# Patient Record
Sex: FEMALE | Race: WHITE | Employment: STUDENT | URBAN - METROPOLITAN AREA
[De-identification: names, ages, dates, MRNs, and addresses within clinical notes are randomized per-mention and may not be internally consistent; named-entity substitution may affect disease eponyms.]

---

## 2019-05-12 ENCOUNTER — HOSPITAL ENCOUNTER (EMERGENCY)
Age: 19
Discharge: HOME | End: 2019-05-12
Attending: EMERGENCY MEDICINE
Payer: COMMERCIAL

## 2019-05-12 ENCOUNTER — APPOINTMENT (OUTPATIENT)
Dept: CT IMAGING | Age: 19
End: 2019-05-12
Attending: EMERGENCY MEDICINE
Payer: COMMERCIAL

## 2019-05-12 VITALS
WEIGHT: 135 LBS | RESPIRATION RATE: 18 BRPM | DIASTOLIC BLOOD PRESSURE: 52 MMHG | SYSTOLIC BLOOD PRESSURE: 107 MMHG | BODY MASS INDEX: 23.17 KG/M2 | TEMPERATURE: 99 F | HEART RATE: 80 BPM | OXYGEN SATURATION: 100 %

## 2019-05-12 DIAGNOSIS — R10.9 ACUTE RIGHT FLANK PAIN: Primary | ICD-10-CM

## 2019-05-12 LAB
ANION GAP SERPL CALC-SCNC: 9 MMOL/L (ref 7–16)
BUN SERPL-MCNC: 15 MG/DL (ref 6–23)
CALCIUM SERPL-MCNC: 9 MG/DL (ref 8.3–10.4)
CHLORIDE SERPL-SCNC: 106 MMOL/L (ref 98–107)
CO2 SERPL-SCNC: 26 MMOL/L (ref 21–32)
CREAT SERPL-MCNC: 0.89 MG/DL (ref 0.6–1)
ERYTHROCYTE [DISTWIDTH] IN BLOOD BY AUTOMATED COUNT: 12 % (ref 11.9–14.6)
GLUCOSE SERPL-MCNC: 91 MG/DL (ref 65–100)
HCG UR QL: NEGATIVE
HCT VFR BLD AUTO: 37.7 % (ref 35.8–46.3)
HGB BLD-MCNC: 12.9 G/DL (ref 11.7–15.4)
MCH RBC QN AUTO: 31.5 PG (ref 26.1–32.9)
MCHC RBC AUTO-ENTMCNC: 34.2 G/DL (ref 31.4–35)
MCV RBC AUTO: 92.2 FL (ref 79.6–97.8)
NRBC # BLD: 0 K/UL (ref 0–0.2)
PLATELET # BLD AUTO: 200 K/UL (ref 150–450)
PMV BLD AUTO: 9 FL (ref 9.4–12.3)
POTASSIUM SERPL-SCNC: 3.8 MMOL/L (ref 3.5–5.1)
RBC # BLD AUTO: 4.09 M/UL (ref 4.05–5.2)
SODIUM SERPL-SCNC: 141 MMOL/L (ref 136–145)
WBC # BLD AUTO: 11.7 K/UL (ref 4.3–11.1)

## 2019-05-12 PROCEDURE — 81003 URINALYSIS AUTO W/O SCOPE: CPT | Performed by: EMERGENCY MEDICINE

## 2019-05-12 PROCEDURE — 96374 THER/PROPH/DIAG INJ IV PUSH: CPT | Performed by: EMERGENCY MEDICINE

## 2019-05-12 PROCEDURE — 80048 BASIC METABOLIC PNL TOTAL CA: CPT

## 2019-05-12 PROCEDURE — 85027 COMPLETE CBC AUTOMATED: CPT

## 2019-05-12 PROCEDURE — 81025 URINE PREGNANCY TEST: CPT

## 2019-05-12 PROCEDURE — 74176 CT ABD & PELVIS W/O CONTRAST: CPT

## 2019-05-12 PROCEDURE — 99284 EMERGENCY DEPT VISIT MOD MDM: CPT | Performed by: EMERGENCY MEDICINE

## 2019-05-12 PROCEDURE — 74011250636 HC RX REV CODE- 250/636: Performed by: EMERGENCY MEDICINE

## 2019-05-12 RX ORDER — PROMETHAZINE HYDROCHLORIDE 25 MG/1
25 TABLET ORAL
Qty: 12 TAB | Refills: 0 | Status: SHIPPED | OUTPATIENT
Start: 2019-05-12 | End: 2019-05-29

## 2019-05-12 RX ORDER — DICLOFENAC SODIUM 50 MG/1
50 TABLET, DELAYED RELEASE ORAL 2 TIMES DAILY
Qty: 14 TAB | Refills: 0 | Status: SHIPPED | OUTPATIENT
Start: 2019-05-12 | End: 2019-05-29

## 2019-05-12 RX ORDER — VENLAFAXINE HYDROCHLORIDE 37.5 MG/1
CAPSULE, EXTENDED RELEASE ORAL DAILY
COMMUNITY
End: 2019-05-29

## 2019-05-12 RX ORDER — CEPHALEXIN 500 MG/1
500 CAPSULE ORAL 2 TIMES DAILY
Qty: 10 CAP | Refills: 0 | Status: SHIPPED | OUTPATIENT
Start: 2019-05-12 | End: 2019-05-17

## 2019-05-12 RX ORDER — KETOROLAC TROMETHAMINE 30 MG/ML
30 INJECTION, SOLUTION INTRAMUSCULAR; INTRAVENOUS
Status: COMPLETED | OUTPATIENT
Start: 2019-05-12 | End: 2019-05-12

## 2019-05-12 RX ADMIN — SODIUM CHLORIDE 1000 ML: 900 INJECTION, SOLUTION INTRAVENOUS at 04:42

## 2019-05-12 RX ADMIN — KETOROLAC TROMETHAMINE 30 MG: 30 INJECTION, SOLUTION INTRAMUSCULAR at 04:42

## 2019-05-12 NOTE — DISCHARGE INSTRUCTIONS
As we discussed, we did not find the exact cause of your symptoms today in the emergency department. Therefore, it is important for you to follow up with your primary care doctor for reevaluation. Please return with any fevers, blood in your vomit or your bowels, worsening symptoms, or additional concerns. 79F PMH HTN p/w HA, gradual onset, for several hrs, diffuse, cant describe quality, similar to prior. Denies vomit, diarrhea, vision changes, weakness/numbness., f/c, neck/back pain, SOB/CP, rhinorrhea/cough, rashes. Ecotrin, no other pain meds.   Also intermittent epigastric discomfort associated w/ nausea, no vomit, x3w. Denies weight loss, night sweats. Per pt, had head imaging ~1-2yrs ago wnl.

## 2019-05-12 NOTE — ED NOTES
I have reviewed discharge instructions with the patient. The patient verbalized understanding. Patient left ED via Discharge Method: ambulatory to Home with mom. Opportunity for questions and clarification provided. Patient given 3 scripts.

## 2019-05-12 NOTE — ED PROVIDER NOTES
19year old lady presents with concerns about sudden onset pain in her right flank. Patient says she had some similar about a week ago and went to an urgent care. That time she was diagnosed with a probable UTI and given some antibiotic. Patient said the pain went away so she did not finish the course of antibiotics. He said the pain then came back she tried some over-the-counter medicine without relief. She rates her pain as a 7 out of 10 and says it is a sharp stabbing pain that radiates somewhat to the back to the front. She denies any vaginal bleeding, discharge, itch, burning, or odor. No known history of appendix problems or kidney stones. She has had her gallbladder taken out. Elements of this note were created using speech recognition software. As such, errors of speech recognition may be present. Past Medical History:  
Diagnosis Date  Contact dermatitis and other eczema, due to unspecified cause  Ill-defined condition Biliary Dyskinesia History reviewed. No pertinent surgical history. Family History:  
Problem Relation Age of Onset  Hypertension Maternal Grandfather  Elevated Lipids Paternal Grandfather  Cancer Paternal Grandfather   
     stomach  Hypertension Paternal Grandfather  Thyroid Cancer Maternal Grandmother Social History Socioeconomic History  Marital status: SINGLE Spouse name: Not on file  Number of children: Not on file  Years of education: Not on file  Highest education level: Not on file Occupational History  Not on file Social Needs  Financial resource strain: Not on file  Food insecurity:  
  Worry: Not on file Inability: Not on file  Transportation needs:  
  Medical: Not on file Non-medical: Not on file Tobacco Use  Smoking status: Never Smoker  Smokeless tobacco: Never Used Substance and Sexual Activity  Alcohol use: No  
 Drug use:  No  
  Sexual activity: Yes  
  Partners: Male Birth control/protection: Pill Lifestyle  Physical activity:  
  Days per week: Not on file Minutes per session: Not on file  Stress: Not on file Relationships  Social connections:  
  Talks on phone: Not on file Gets together: Not on file Attends Sikhism service: Not on file Active member of club or organization: Not on file Attends meetings of clubs or organizations: Not on file Relationship status: Not on file  Intimate partner violence:  
  Fear of current or ex partner: Not on file Emotionally abused: Not on file Physically abused: Not on file Forced sexual activity: Not on file Other Topics Concern  Not on file Social History Narrative  Not on file ALLERGIES: Patient has no known allergies. Review of Systems Constitutional: Negative for chills, diaphoresis and fever. HENT: Negative for congestion, rhinorrhea and sore throat. Eyes: Negative for redness and visual disturbance. Respiratory: Negative for cough, chest tightness, shortness of breath and wheezing. Cardiovascular: Negative for chest pain and palpitations. Gastrointestinal: Positive for abdominal pain and nausea. Negative for blood in stool, diarrhea and vomiting. Endocrine: Negative for polydipsia and polyuria. Genitourinary: Positive for flank pain. Negative for dysuria and hematuria. Musculoskeletal: Negative for arthralgias, myalgias and neck stiffness. Skin: Negative for rash. Allergic/Immunologic: Negative for environmental allergies and food allergies. Neurological: Negative for dizziness, weakness and headaches. Hematological: Negative for adenopathy. Does not bruise/bleed easily. Psychiatric/Behavioral: Negative for confusion and sleep disturbance. The patient is not nervous/anxious. Vitals:  
 05/12/19 0402 BP: 107/52 Pulse: 80 Resp: 18 Temp: 99 °F (37.2 °C) SpO2: 100% Weight: 61.2 kg (135 lb) Physical Exam  
Constitutional: She is oriented to person, place, and time. She appears well-developed and well-nourished. HENT:  
Head: Normocephalic and atraumatic. Mouth/Throat: Oropharynx is clear and moist.  
Eyes: Pupils are equal, round, and reactive to light. Conjunctivae are normal. Right eye exhibits no discharge. Left eye exhibits no discharge. Neck: No thyromegaly present. Cardiovascular: Normal rate, regular rhythm and normal heart sounds. No murmur heard. Pulmonary/Chest: Effort normal and breath sounds normal.  
Abdominal: Soft. Bowel sounds are normal. There is tenderness. There is no rebound and no guarding. Mild right flank tenderness with no rebound or guarding Musculoskeletal: Normal range of motion. She exhibits no edema. Neurological: She is alert and oriented to person, place, and time. She exhibits normal muscle tone. Coordination normal.  
Skin: Skin is warm and dry. Psychiatric: She has a normal mood and affect. Her behavior is normal.  
  
 
MDM Number of Diagnoses or Management Options Diagnosis management comments: I will get a CT scan to evaluate for possible kidney stone versus appendicitis. 5:43 AM 
CT shows no evidence for appendicitis or kidney stone. I suspect she may have had a partially treated bladder infection or potentially some intestinal spasms. Since she says that her symptoms are significantly worse than they were prior to her initial round of antibiotics I will treat with some Keflex. Procedures

## 2019-05-28 ENCOUNTER — HOSPITAL ENCOUNTER (EMERGENCY)
Age: 19
Discharge: HOME | End: 2019-05-28
Attending: EMERGENCY MEDICINE
Payer: COMMERCIAL

## 2019-05-28 VITALS
TEMPERATURE: 98 F | HEIGHT: 65 IN | BODY MASS INDEX: 22.49 KG/M2 | DIASTOLIC BLOOD PRESSURE: 52 MMHG | WEIGHT: 135 LBS | OXYGEN SATURATION: 100 % | HEART RATE: 68 BPM | RESPIRATION RATE: 15 BRPM | SYSTOLIC BLOOD PRESSURE: 92 MMHG

## 2019-05-28 DIAGNOSIS — R55 SYNCOPE AND COLLAPSE: Primary | ICD-10-CM

## 2019-05-28 DIAGNOSIS — M54.6 RIGHT-SIDED THORACIC BACK PAIN, UNSPECIFIED CHRONICITY: ICD-10-CM

## 2019-05-28 LAB
ALBUMIN SERPL-MCNC: 3.8 G/DL (ref 3.5–5)
ALBUMIN/GLOB SERPL: 1 {RATIO} (ref 1.2–3.5)
ALP SERPL-CCNC: 63 U/L (ref 50–130)
ALT SERPL-CCNC: 33 U/L (ref 12–65)
ANION GAP SERPL CALC-SCNC: 9 MMOL/L (ref 7–16)
APPEARANCE UR: CLEAR
AST SERPL-CCNC: 24 U/L (ref 15–37)
ATRIAL RATE: 60 BPM
BACTERIA URNS QL MICRO: 0 /HPF
BASOPHILS # BLD: 0.1 K/UL (ref 0–0.2)
BASOPHILS NFR BLD: 1 % (ref 0–2)
BILIRUB SERPL-MCNC: 0.5 MG/DL (ref 0.2–1.1)
BILIRUB UR QL: NEGATIVE
BUN SERPL-MCNC: 12 MG/DL (ref 6–23)
CALCIUM SERPL-MCNC: 9.1 MG/DL (ref 8.3–10.4)
CALCULATED P AXIS, ECG09: 52 DEGREES
CALCULATED R AXIS, ECG10: 88 DEGREES
CALCULATED T AXIS, ECG11: 44 DEGREES
CHLORIDE SERPL-SCNC: 106 MMOL/L (ref 98–107)
CO2 SERPL-SCNC: 28 MMOL/L (ref 21–32)
COLOR UR: YELLOW
CREAT SERPL-MCNC: 0.85 MG/DL (ref 0.6–1)
DIAGNOSIS, 93000: NORMAL
DIFFERENTIAL METHOD BLD: ABNORMAL
EOSINOPHIL # BLD: 0.1 K/UL (ref 0–0.8)
EOSINOPHIL NFR BLD: 3 % (ref 0.5–7.8)
ERYTHROCYTE [DISTWIDTH] IN BLOOD BY AUTOMATED COUNT: 12 % (ref 11.9–14.6)
GLOBULIN SER CALC-MCNC: 3.7 G/DL (ref 2.3–3.5)
GLUCOSE SERPL-MCNC: 85 MG/DL (ref 65–100)
GLUCOSE UR STRIP.AUTO-MCNC: NEGATIVE MG/DL
HCG UR QL: NEGATIVE
HCT VFR BLD AUTO: 43.7 % (ref 35.8–46.3)
HGB BLD-MCNC: 14.8 G/DL (ref 11.7–15.4)
HGB UR QL STRIP: NEGATIVE
IMM GRANULOCYTES # BLD AUTO: 0 K/UL (ref 0–0.5)
IMM GRANULOCYTES NFR BLD AUTO: 0 % (ref 0–5)
KETONES UR QL STRIP.AUTO: NEGATIVE MG/DL
LEUKOCYTE ESTERASE UR QL STRIP.AUTO: NEGATIVE
LYMPHOCYTES # BLD: 2.3 K/UL (ref 0.5–4.6)
LYMPHOCYTES NFR BLD: 43 % (ref 13–44)
MCH RBC QN AUTO: 30.8 PG (ref 26.1–32.9)
MCHC RBC AUTO-ENTMCNC: 33.9 G/DL (ref 31.4–35)
MCV RBC AUTO: 91 FL (ref 79.6–97.8)
MONOCYTES # BLD: 0.4 K/UL (ref 0.1–1.3)
MONOCYTES NFR BLD: 7 % (ref 4–12)
NEUTS SEG # BLD: 2.5 K/UL (ref 1.7–8.2)
NEUTS SEG NFR BLD: 47 % (ref 43–78)
NITRITE UR QL STRIP.AUTO: NEGATIVE
NRBC # BLD: 0 K/UL (ref 0–0.2)
P-R INTERVAL, ECG05: 142 MS
PH UR STRIP: 6.5 [PH] (ref 5–9)
PLATELET # BLD AUTO: 303 K/UL (ref 150–450)
PMV BLD AUTO: 8.6 FL (ref 9.4–12.3)
POTASSIUM SERPL-SCNC: 3.5 MMOL/L (ref 3.5–5.1)
PROT SERPL-MCNC: 7.5 G/DL (ref 6.3–8.2)
PROT UR STRIP-MCNC: NEGATIVE MG/DL
Q-T INTERVAL, ECG07: 450 MS
QRS DURATION, ECG06: 90 MS
QTC CALCULATION (BEZET), ECG08: 450 MS
RBC # BLD AUTO: 4.8 M/UL (ref 4.05–5.2)
SODIUM SERPL-SCNC: 143 MMOL/L (ref 136–145)
SP GR UR REFRACTOMETRY: 1.01 (ref 1–1.02)
UROBILINOGEN UR QL STRIP.AUTO: 0.2 EU/DL (ref 0.2–1)
VENTRICULAR RATE, ECG03: 60 BPM
WBC # BLD AUTO: 5.4 K/UL (ref 4.3–11.1)

## 2019-05-28 PROCEDURE — 74011250636 HC RX REV CODE- 250/636: Performed by: EMERGENCY MEDICINE

## 2019-05-28 PROCEDURE — 85025 COMPLETE CBC W/AUTO DIFF WBC: CPT

## 2019-05-28 PROCEDURE — 93005 ELECTROCARDIOGRAM TRACING: CPT | Performed by: EMERGENCY MEDICINE

## 2019-05-28 PROCEDURE — 81025 URINE PREGNANCY TEST: CPT

## 2019-05-28 PROCEDURE — 99285 EMERGENCY DEPT VISIT HI MDM: CPT | Performed by: EMERGENCY MEDICINE

## 2019-05-28 PROCEDURE — 81003 URINALYSIS AUTO W/O SCOPE: CPT

## 2019-05-28 PROCEDURE — 80053 COMPREHEN METABOLIC PANEL: CPT

## 2019-05-28 PROCEDURE — 96360 HYDRATION IV INFUSION INIT: CPT | Performed by: EMERGENCY MEDICINE

## 2019-05-28 RX ADMIN — SODIUM CHLORIDE 1000 ML: 900 INJECTION, SOLUTION INTRAVENOUS at 07:12

## 2019-05-28 NOTE — ED NOTES
I have reviewed discharge instructions with the patient. The patient verbalized understanding. Patient left ED via Discharge Method: ambulatory to Home with family     Opportunity for questions and clarification provided. Patient given 0 scripts. To continue your aftercare when you leave the hospital, you may receive an automated call from our care team to check in on how you are doing. This is a free service and part of our promise to provide the best care and service to meet your aftercare needs.  If you have questions, or wish to unsubscribe from this service please call [phone]. Thank you for Choosing our [facility] Emergency Department.

## 2019-05-28 NOTE — DISCHARGE INSTRUCTIONS
Drink 1/2-2 L of water daily. Do stretching exercises with your workouts. Follow-up with cardiology for possible Holter monitor study if your symptoms persist or if you develop new concerning symptoms return to the ER at that time.

## 2019-05-28 NOTE — ED PROVIDER NOTES
The patient is a 18-year-old female who presents to the emergency department today complaining of 2 syncopal episodes this morning. Patient states that she was saying goodbye to her boyfriend this morning around 5:00 as he was leaving the house and she passed out for just a moment. She said that she hit the ground with her face and is now having a small amount of pain over this area. The patient said that she and the boyfriend got her back and side and then she had another syncopal event. The patient states that over the last 3 weeks she's been dealing with some right back pain and was treated with antibiotics for urinary tract infection ×3. She took 7 days of Bactrim 5 days of Keflex and then 7 days of ciprofloxacin. Patient is not on any antibiotics at this time. The patient states that she's never had a syncopal event similar to this. She is drinking well. She denies any tobacco alcohol or recreational drug use. The patient works at a coffee shop and goes to school. The patient states that she is on birth control pills and notes her last partial cycle was 2 days ago. Past Medical History:   Diagnosis Date    Contact dermatitis and other eczema, due to unspecified cause     Ill-defined condition     Biliary Dyskinesia       History reviewed. No pertinent surgical history.       Family History:   Problem Relation Age of Onset    Hypertension Maternal Grandfather     Elevated Lipids Paternal Grandfather     Cancer Paternal Grandfather         stomach    Hypertension Paternal Grandfather     Thyroid Cancer Maternal Grandmother        Social History     Socioeconomic History    Marital status: SINGLE     Spouse name: Not on file    Number of children: Not on file    Years of education: Not on file    Highest education level: Not on file   Occupational History    Not on file   Social Needs    Financial resource strain: Not on file    Food insecurity:     Worry: Not on file Inability: Not on file    Transportation needs:     Medical: Not on file     Non-medical: Not on file   Tobacco Use    Smoking status: Never Smoker    Smokeless tobacco: Never Used   Substance and Sexual Activity    Alcohol use: No    Drug use: No    Sexual activity: Yes     Partners: Male     Birth control/protection: Pill   Lifestyle    Physical activity:     Days per week: Not on file     Minutes per session: Not on file    Stress: Not on file   Relationships    Social connections:     Talks on phone: Not on file     Gets together: Not on file     Attends Islam service: Not on file     Active member of club or organization: Not on file     Attends meetings of clubs or organizations: Not on file     Relationship status: Not on file    Intimate partner violence:     Fear of current or ex partner: Not on file     Emotionally abused: Not on file     Physically abused: Not on file     Forced sexual activity: Not on file   Other Topics Concern    Not on file   Social History Narrative    Not on file         ALLERGIES: Patient has no known allergies. Review of Systems   All other systems reviewed and are negative. Vitals:    05/28/19 0750 05/28/19 0752 05/28/19 0758 05/28/19 0800   BP: 115/64 104/60 102/63 124/55   Pulse: 69 75 69 73   Resp: 17 13  21   Temp:       SpO2: 98% 100% 100% 100%   Weight:       Height:                Physical Exam     GENERAL:The patient is well nourished, and well-hydrated. VITAL SIGNS: Heart rate, blood pressure, respiratory rate reviewed as recorded in  nurse's notes  EYES: Pupils reactive. Extraocular motion intact. No conjunctival redness or drainage. EARS: No external masses or lesions. NOSE: No nasal drainage or epistaxis. MOUTH/THROAT: Pharynx clear; airway patent. NECK: Supple, no meningeal signs. Trachea midline. No masses or thyromegaly.   LUNGS: Breath sounds clear and equal bilaterally no accessory muscle use  CHEST: No deformity  CARDIOVASCULAR: Regular rate and rhythm  ABDOMEN: Soft without tenderness. No palpable masses or organomegaly. No  peritoneal signs. No rigidity. EXTREMITIES: No clubbing or cyanosis. No joint swelling. Normal muscle tone. No  restricted range of motion appreciated. NEUROLOGIC: Sensation is grossly intact. Cranial nerve exam reveals face is  symmetrical, tongue is midline speech is clear. SKIN: No rash or petechiae. Good skin turgor palpated. PSYCHIATRIC: Alert and oriented. Appropriate behavior and judgment. MDM  Number of Diagnoses or Management Options  Diagnosis management comments: Scoliosis, muscle strain, UTI, pregnancy,    tension headache, migraine headache,    meningitis, encephalitis,    seizure, pseudoseizures, status epilepticus, malingering    peripheral neuropathy, metabolic disorder, Guillian Gordon syndrome, multiple sclerosis,    electrolyte abnormality         Amount and/or Complexity of Data Reviewed  Clinical lab tests: ordered and reviewed      ED Course as of May 28 0935   Tue May 28, 2019   0932 I spoke with the patient and her mother about the findings in the emergency department today. The patient appears to be doing well. Her mom states that she is a nurse practitioner and she is just frustrated that they cannot figure out what is going on with her daughter. I encouraged him to possibly follow up with cardiology and have a Holter monitor study performed secondary to her syncopal event today.   I also encouraged the patient to do some stretching exercises for the pain she is experiencing.    [KH]      ED Course User Index  [KH] Hima Garland DO       Procedures

## 2019-05-30 ENCOUNTER — HOSPITAL ENCOUNTER (OUTPATIENT)
Dept: ULTRASOUND IMAGING | Age: 19
Discharge: HOME | End: 2019-05-30
Attending: NURSE PRACTITIONER
Payer: COMMERCIAL

## 2019-05-30 DIAGNOSIS — R10.9 RIGHT FLANK PAIN: ICD-10-CM

## 2019-05-30 DIAGNOSIS — R31.0 GROSS HEMATURIA: ICD-10-CM

## 2019-05-30 PROCEDURE — 76770 US EXAM ABDO BACK WALL COMP: CPT

## 2019-05-31 NOTE — PROGRESS NOTES
Sent message to patient on my chart. Please call her to set up cystoscopy with Dr Rikki Roberson. Discussed findings with Dr Rikki Roberson. He agrees Henna should have CT abdomen pelvis with without contrast to follow up mild hydronephrosis on the right. The contrast should help us  any obstructions. He also agrees that she should have cystoscopy in the office to follow up hematuria. Someone will call next week to schedule this. Radiology will call to schedule CT.

## 2019-06-06 ENCOUNTER — HOSPITAL ENCOUNTER (OUTPATIENT)
Dept: CT IMAGING | Age: 19
Discharge: HOME | End: 2019-06-06
Attending: NURSE PRACTITIONER
Payer: COMMERCIAL

## 2019-06-06 VITALS — HEIGHT: 63 IN | WEIGHT: 135 LBS | BODY MASS INDEX: 23.92 KG/M2

## 2019-06-06 DIAGNOSIS — R10.9 RIGHT FLANK PAIN: ICD-10-CM

## 2019-06-06 DIAGNOSIS — R31.0 GROSS HEMATURIA: ICD-10-CM

## 2019-06-06 PROCEDURE — 74011636320 HC RX REV CODE- 636/320: Performed by: NURSE PRACTITIONER

## 2019-06-06 PROCEDURE — 74178 CT ABD&PLV WO CNTR FLWD CNTR: CPT

## 2019-06-06 PROCEDURE — 74011000258 HC RX REV CODE- 258: Performed by: NURSE PRACTITIONER

## 2019-06-06 RX ORDER — SODIUM CHLORIDE 0.9 % (FLUSH) 0.9 %
10 SYRINGE (ML) INJECTION
Status: COMPLETED | OUTPATIENT
Start: 2019-06-06 | End: 2019-06-06

## 2019-06-06 RX ADMIN — IOPAMIDOL 100 ML: 755 INJECTION, SOLUTION INTRAVENOUS at 09:34

## 2019-06-06 RX ADMIN — Medication 10 ML: at 09:34

## 2019-06-06 RX ADMIN — SODIUM CHLORIDE 100 ML: 900 INJECTION, SOLUTION INTRAVENOUS at 09:34

## 2019-06-06 NOTE — PROGRESS NOTES
CT with no stones, pyelonephrosis, masses, or hydronephrosis. No etiology for pain or hematuria noted. Needs cystoscopy in office. Please have this scheduled. Previously requested to see Dr Lesia Greenberg.

## 2022-07-13 ENCOUNTER — TELEPHONE (OUTPATIENT)
Dept: OBGYN CLINIC | Age: 22
End: 2022-07-13

## 2022-07-13 RX ORDER — ACYCLOVIR 800 MG/1
800 TABLET ORAL 2 TIMES DAILY
Qty: 30 TABLET | Refills: 5 | Status: SHIPPED | OUTPATIENT
Start: 2022-07-13

## 2023-09-04 SDOH — ECONOMIC STABILITY: FOOD INSECURITY: WITHIN THE PAST 12 MONTHS, THE FOOD YOU BOUGHT JUST DIDN'T LAST AND YOU DIDN'T HAVE MONEY TO GET MORE.: NEVER TRUE

## 2023-09-04 SDOH — ECONOMIC STABILITY: HOUSING INSECURITY
IN THE LAST 12 MONTHS, WAS THERE A TIME WHEN YOU DID NOT HAVE A STEADY PLACE TO SLEEP OR SLEPT IN A SHELTER (INCLUDING NOW)?: NO

## 2023-09-04 SDOH — ECONOMIC STABILITY: TRANSPORTATION INSECURITY
IN THE PAST 12 MONTHS, HAS LACK OF TRANSPORTATION KEPT YOU FROM MEETINGS, WORK, OR FROM GETTING THINGS NEEDED FOR DAILY LIVING?: NO

## 2023-09-04 SDOH — ECONOMIC STABILITY: FOOD INSECURITY: WITHIN THE PAST 12 MONTHS, YOU WORRIED THAT YOUR FOOD WOULD RUN OUT BEFORE YOU GOT MONEY TO BUY MORE.: NEVER TRUE

## 2023-09-04 SDOH — ECONOMIC STABILITY: INCOME INSECURITY: HOW HARD IS IT FOR YOU TO PAY FOR THE VERY BASICS LIKE FOOD, HOUSING, MEDICAL CARE, AND HEATING?: NOT VERY HARD

## 2023-09-07 ENCOUNTER — OFFICE VISIT (OUTPATIENT)
Dept: OBGYN CLINIC | Age: 23
End: 2023-09-07
Payer: COMMERCIAL

## 2023-09-07 VITALS
SYSTOLIC BLOOD PRESSURE: 110 MMHG | BODY MASS INDEX: 23.57 KG/M2 | DIASTOLIC BLOOD PRESSURE: 60 MMHG | HEIGHT: 63 IN | WEIGHT: 133 LBS

## 2023-09-07 DIAGNOSIS — Z11.3 SCREENING FOR STD (SEXUALLY TRANSMITTED DISEASE): ICD-10-CM

## 2023-09-07 DIAGNOSIS — Z01.419 WELL WOMAN EXAM WITH ROUTINE GYNECOLOGICAL EXAM: Primary | ICD-10-CM

## 2023-09-07 DIAGNOSIS — Z12.4 SCREENING FOR CERVICAL CANCER: ICD-10-CM

## 2023-09-07 PROCEDURE — 99395 PREV VISIT EST AGE 18-39: CPT | Performed by: OBSTETRICS & GYNECOLOGY

## 2023-09-13 LAB
C TRACH RRNA CVX QL NAA+PROBE: NEGATIVE
CYTOLOGIST CVX/VAG CYTO: ABNORMAL
CYTOLOGY CVX/VAG DOC THIN PREP: ABNORMAL
HPV REFLEX: ABNORMAL
Lab: ABNORMAL
N GONORRHOEA RRNA CVX QL NAA+PROBE: NEGATIVE
PATH REPORT.FINAL DX SPEC: ABNORMAL
PATHOLOGIST CVX/VAG CYTO: ABNORMAL
PATHOLOGIST PROVIDED ICD: ABNORMAL
STAT OF ADQ CVX/VAG CYTO-IMP: ABNORMAL
T VAGINALIS RRNA SPEC QL NAA+PROBE: NEGATIVE

## 2024-04-03 ENCOUNTER — PROCEDURE VISIT (OUTPATIENT)
Dept: OBGYN CLINIC | Age: 24
End: 2024-04-03
Payer: COMMERCIAL

## 2024-04-03 ENCOUNTER — OFFICE VISIT (OUTPATIENT)
Dept: OBGYN CLINIC | Age: 24
End: 2024-04-03
Payer: COMMERCIAL

## 2024-04-03 VITALS
WEIGHT: 143 LBS | SYSTOLIC BLOOD PRESSURE: 124 MMHG | BODY MASS INDEX: 25.34 KG/M2 | HEIGHT: 63 IN | DIASTOLIC BLOOD PRESSURE: 68 MMHG

## 2024-04-03 DIAGNOSIS — Z3A.08 8 WEEKS GESTATION OF PREGNANCY: ICD-10-CM

## 2024-04-03 DIAGNOSIS — N92.6 MISSED MENSES: Primary | ICD-10-CM

## 2024-04-03 LAB
ABO + RH BLD: NORMAL
BASOPHILS # BLD: 0 K/UL (ref 0–0.2)
BASOPHILS NFR BLD: 1 % (ref 0–2)
BLOOD GROUP ANTIBODIES SERPL: NORMAL
DIFFERENTIAL METHOD BLD: NORMAL
EOSINOPHIL # BLD: 0.1 K/UL (ref 0–0.8)
EOSINOPHIL NFR BLD: 1 % (ref 0.5–7.8)
ERYTHROCYTE [DISTWIDTH] IN BLOOD BY AUTOMATED COUNT: 12.2 % (ref 11.9–14.6)
HBV SURFACE AG SER QL: NONREACTIVE
HCT VFR BLD AUTO: 39.9 % (ref 35.8–46.3)
HCV AB SER QL: NONREACTIVE
HGB BLD-MCNC: 13.6 G/DL (ref 11.7–15.4)
HIV 1+2 AB+HIV1 P24 AG SERPL QL IA: NONREACTIVE
HIV 1/2 RESULT COMMENT: NORMAL
IMM GRANULOCYTES # BLD AUTO: 0.1 K/UL (ref 0–0.5)
IMM GRANULOCYTES NFR BLD AUTO: 1 % (ref 0–5)
LYMPHOCYTES # BLD: 2.3 K/UL (ref 0.5–4.6)
LYMPHOCYTES NFR BLD: 27 % (ref 13–44)
MCH RBC QN AUTO: 31.2 PG (ref 26.1–32.9)
MCHC RBC AUTO-ENTMCNC: 34.1 G/DL (ref 31.4–35)
MCV RBC AUTO: 91.5 FL (ref 82–102)
MONOCYTES # BLD: 0.5 K/UL (ref 0.1–1.3)
MONOCYTES NFR BLD: 5 % (ref 4–12)
NEUTS SEG # BLD: 5.7 K/UL (ref 1.7–8.2)
NEUTS SEG NFR BLD: 65 % (ref 43–78)
NRBC # BLD: 0 K/UL (ref 0–0.2)
PLATELET # BLD AUTO: 264 K/UL (ref 150–450)
PMV BLD AUTO: 9.5 FL (ref 9.4–12.3)
RBC # BLD AUTO: 4.36 M/UL (ref 4.05–5.2)
RUBV IGG SERPL IA-ACNC: 15.2 IU/ML
WBC # BLD AUTO: 8.6 K/UL (ref 4.3–11.1)

## 2024-04-03 PROCEDURE — 99214 OFFICE O/P EST MOD 30 MIN: CPT | Performed by: OBSTETRICS & GYNECOLOGY

## 2024-04-03 PROCEDURE — 76830 TRANSVAGINAL US NON-OB: CPT | Performed by: OBSTETRICS & GYNECOLOGY

## 2024-04-03 NOTE — PROGRESS NOTES
FF  Impression/Interpretation: Normal first trimester IUP with size = dates, 8w3d ALCIDES 11/10/24  Plan: Routine new OB follow up.  Mehnaz Berg MD FACOG       1. Missed menses    2. 8 weeks gestation of pregnancy      Orders Placed This Encounter   Procedures    Culture, Urine    Aerobic Susceptibility Identification    Aerobe Identification Reflex    CBC with Auto Differential    Rubella antibody, IgG    HIV 1/2 Ag/Ab, 4TH Generation,W Rflx Confirm    Hepatitis C Antibody    Hepatitis B Surface Antigen    RPR    ABO/Rh    Antibody Screen     No orders of the defined types were placed in this encounter.    IUP confirmed with ultrasound office today after missed period. ROS reveals common discomforts of pregnancy. Reviewed anticipated ALCIDES with patient based on LMP which is confirmed on imaging today. Questions encouraged and answered. Reviewed plan of care for supervision of pregnancy. Patient will return for a new OB education appt and have prenatal labs reviewed at that time.  Encouraged taking a daily prenatal vitamin with folic acid and DHA which patient is already doing. Patient instructed to notify us or seek medical attention for any abdominal pain, cramping and vaginal bleeding, fever, or vomiting other than mild pregnancy sickness.   Discussed baby ASA recommendations - 81mg from 12-14wks through 36 weeks to aid in prevention of preeclampsia in women with >1 risk factor.  Discussed option for genetic screening & MFM referral for nuchal translucency and any maternal indications from PMH/PSH/FH, etc.     Atypical pap smear in September, will need to repeat pap at her newOB exam.    Greater than 30 minutes spent on face to face counseling, education, and examining the patient regarding missed period, pregnancy dx and POC for follow up visits.         Return in about 2 weeks (around 4/17/2024), or if symptoms worsen or fail to improve, for NewOB education w/ OB coordinator; 4wks for newOb exam w/ Dr. SALINAS

## 2024-04-03 NOTE — PATIENT INSTRUCTIONS
Patient Education        Weeks 6 to 10 of Your Pregnancy: Care Instructions  During these weeks of pregnancy, your body goes through many changes. You may start to feel different, both in your body and your emotions. Each pregnancy is different, so there's no \"right\" way to feel. These early weeks are a time to make healthy choices for you and your pregnancy.    Take a daily prenatal vitamin. Choose one with folic acid in it.    Avoid alcohol, tobacco, and drugs (including marijuana). If you need help quitting, talk to your doctor.    Drink plenty of liquids.  Be sure to drink enough water. And limit sodas, other sweetened drinks, and caffeine.     Choose foods that are good sources of calcium, iron, and folate.  You can try dairy products, dark leafy greens, fortified orange juice and cereals, almonds, broccoli, dried fruit, and beans.     Avoid foods that may be harmful.  Don't eat raw meat, deli meat, raw seafood, or raw eggs. Avoid soft cheese and unpasteurized dairy, like Brie and blue cheese. And don't eat fish that contains a lot of mercury, like shark and swordfish.     Don't touch yonny litter or cat poop.  They can cause an infection that could be harmful during pregnancy.     Avoid things that can make your body too hot.  For example, avoid hot tubs and saunas.     Soothe morning sickness.  Try eating 5 or 6 small meals a day, getting some fresh air, or using tobin to control symptoms.     Ask your doctor about flu and COVID-19 shots.  Getting them can help protect against infection.   Follow-up care is a key part of your treatment and safety. Be sure to make and go to all appointments, and call your doctor if you are having problems. It's also a good idea to know your test results and keep a list of the medicines you take.  Where can you learn more?  Go to https://www.healthwise.net/patientEd and enter G112 to learn more about \"Weeks 6 to 10 of Your Pregnancy: Care Instructions.\"  Current as of: July

## 2024-04-04 LAB — RPR SER QL: NONREACTIVE

## 2024-04-09 LAB
AEROBE ID RESULT 1: ABNORMAL
ANTIMICROBIAL SUSCEPTIBILITY: ABNORMAL
BACTERIA ISLT: ABNORMAL
SPECIMEN SOURCE: ABNORMAL

## 2024-04-09 NOTE — PROGRESS NOTES
NOB consult with pt. Labs today: Carrier testing, NIPT. Offered pt the option of CF, SMA, and Fragile X carrier testing. Pt desires testing. Offered pt the option of genetic screening (1st screen vs Tetra vs NIPT). Pt desires NIPT. Instructed pt on exercise/nutrition in pregnancy. Reviewed [facility] preg book. Advised pt on using [facility] for hospital needs and [facility] L&D for pregnancy related emergencies. Pt states understanding. NOB forms signed, scanned, and given to pt.     Medical Hx: Chlamydia (2020) - Herpes - Biliary dyskinesia (2015)    IMPACTT Program discussed with patient and sheet given to her during visit today. Encouraged her to inform us should she start having increase in depression/anxiety.    Surgical Hx: Cholecystectomy (2015)    Last Pap: 09/2023 [facility]    Pt OB c/o: None.     Fam hx any chromosomal or inheritable disorders: None.    COVID Vaccine: x0 per patient  Flu Vaccine: Discussed flu recommendations with patient. Patient declines flu vaccine.    OB hx: G1: 04/2024, Current.    NV: Next appointment scheduled on 5/2 with Dr Berg.

## 2024-04-11 RX ORDER — CEPHALEXIN 500 MG/1
500 CAPSULE ORAL 2 TIMES DAILY
Qty: 14 CAPSULE | Refills: 0 | Status: SHIPPED | OUTPATIENT
Start: 2024-04-11 | End: 2024-04-18

## 2024-04-12 LAB
BACTERIA SPEC CULT: ABNORMAL
BACTERIA SPEC CULT: ABNORMAL
SERVICE CMNT-IMP: ABNORMAL

## 2024-04-18 ENCOUNTER — NURSE ONLY (OUTPATIENT)
Dept: OBGYN CLINIC | Age: 24
End: 2024-04-18

## 2024-04-18 VITALS — BODY MASS INDEX: 25.16 KG/M2 | HEIGHT: 63 IN | WEIGHT: 142 LBS

## 2024-04-18 DIAGNOSIS — Z3A.10 10 WEEKS GESTATION OF PREGNANCY: Primary | ICD-10-CM

## 2024-04-18 DIAGNOSIS — Z34.91 NORMAL PREGNANCY, FIRST TRIMESTER: ICD-10-CM

## 2024-04-24 ENCOUNTER — TELEPHONE (OUTPATIENT)
Dept: OBGYN CLINIC | Age: 24
End: 2024-04-24

## 2024-04-24 RX ORDER — NITROFURANTOIN 25; 75 MG/1; MG/1
100 CAPSULE ORAL 2 TIMES DAILY
Qty: 20 CAPSULE | Refills: 0 | Status: SHIPPED | OUTPATIENT
Start: 2024-04-24 | End: 2024-05-04

## 2024-04-24 NOTE — TELEPHONE ENCOUNTER
----- Message from Elinor Blanchard sent at 4/24/2024  1:16 PM EDT -----  Regarding: UTI Symptoms  Contact: [phone]  Yes I've had a kidney infection before back in 2019 that was on going and took a while to treat. After that, I've had problems with getting uti's super easily. The pain in my side is not constant just occasionally throughout the day! Dr. Berg prescribed me keflex that I just finished a 7 day dose of. I have my monthly OB check up next Thursday so I can just wait and see if symptoms progress or if I'm okay!

## 2024-04-24 NOTE — TELEPHONE ENCOUNTER
Per Dr. Berg pt to leave another sample for culture and go ahead and send in Macrobid 100 mg BID x10 days to take for now.     Pt aware and states she can leave urine sample tomorrow. Nurse only appointment scheduled. Macrobid e-scribed.

## 2024-04-25 ENCOUNTER — TELEPHONE (OUTPATIENT)
Dept: OBGYN CLINIC | Age: 24
End: 2024-04-25

## 2024-04-25 LAB
Lab: NEGATIVE
Lab: NORMAL
NTRA CYSTIC FIBROSIS: NEGATIVE
NTRA FRAGILE X SYNDROME: NEGATIVE
NTRA SPINAL MUSCULAR ATROPHY: NEGATIVE

## 2024-04-25 NOTE — TELEPHONE ENCOUNTER
Per Halina, call placed to pt asking her to come in and leave urine specimen in sterile container.   Left pt voicemail with above information 1:10

## 2024-04-26 LAB
Lab: NORMAL
NTRA FETAL FRACTION: NORMAL
NTRA GENDER OF FETUS: NORMAL
NTRA MONOSOMY X AGE-BASED RISK TEXT: NORMAL
NTRA MONOSOMY X RESULT TEXT: NORMAL
NTRA MONOSOMY X RISK SCORE TEXT: NORMAL
NTRA TRIPLOIDY RESULT TEXT: NORMAL
NTRA TRISOMY 13 AGE-BASED RISK TEXT: NORMAL
NTRA TRISOMY 13 RESULT TEXT: NORMAL
NTRA TRISOMY 13 RISK SCORE TEXT: NORMAL
NTRA TRISOMY 18 AGE-BASED RISK TEXT: NORMAL
NTRA TRISOMY 18 RESULT TEXT: NORMAL
NTRA TRISOMY 18 RISK SCORE TEXT: NORMAL
NTRA TRISOMY 21 AGE-BASED RISK TEXT: NORMAL
NTRA TRISOMY 21 RESULT TEXT: NORMAL
NTRA TRISOMY 21 RISK SCORE TEXT: NORMAL

## 2024-05-02 ENCOUNTER — ROUTINE PRENATAL (OUTPATIENT)
Dept: OBGYN CLINIC | Age: 24
End: 2024-05-02

## 2024-05-02 VITALS — SYSTOLIC BLOOD PRESSURE: 110 MMHG | DIASTOLIC BLOOD PRESSURE: 66 MMHG | BODY MASS INDEX: 25.51 KG/M2 | WEIGHT: 144 LBS

## 2024-05-02 DIAGNOSIS — Z3A.12 12 WEEKS GESTATION OF PREGNANCY: ICD-10-CM

## 2024-05-02 DIAGNOSIS — Z11.51 SCREENING FOR HUMAN PAPILLOMAVIRUS (HPV): ICD-10-CM

## 2024-05-02 DIAGNOSIS — O23.41 UTI IN PREGNANCY, ANTEPARTUM, FIRST TRIMESTER: ICD-10-CM

## 2024-05-02 DIAGNOSIS — R87.611 PAP SMEAR OF CERVIX WITH ASCUS, CANNOT EXCLUDE HGSIL: ICD-10-CM

## 2024-05-02 DIAGNOSIS — Z11.3 SCREEN FOR STD (SEXUALLY TRANSMITTED DISEASE): ICD-10-CM

## 2024-05-02 DIAGNOSIS — Z34.92 NORMAL PREGNANCY, SECOND TRIMESTER: Primary | ICD-10-CM

## 2024-05-02 DIAGNOSIS — Z12.4 SCREENING FOR CERVICAL CANCER: ICD-10-CM

## 2024-05-02 PROCEDURE — 0501F PRENATAL FLOW SHEET: CPT | Performed by: OBSTETRICS & GYNECOLOGY

## 2024-05-02 NOTE — PROGRESS NOTES
Patient presents today for   Chief Complaint   Patient presents with    Initial Prenatal Visit         LMP: Patient's last menstrual period was 02/04/2024.  Contraception: none        No Known Allergies  Current Outpatient Medications   Medication Sig Dispense Refill    Prenatal Vit-Fe Fumarate-FA (PRENATAL PO) Take by mouth      acyclovir (ZOVIRAX) 800 MG tablet Take 1 tablet by mouth 2 times daily 30 tablet 5     No current facility-administered medications for this visit.     Past Medical History:   Diagnosis Date    Chlamydia 1015/20    HSV-2 infection     Ill-defined condition     Biliary Dyskinesia     Past Surgical History:   Procedure Laterality Date    GALLBLADDER SURGERY  2015     Social History     Socioeconomic History    Marital status: Life Partner     Spouse name: Not on file    Number of children: Not on file    Years of education: Not on file    Highest education level: Not on file   Occupational History    Not on file   Tobacco Use    Smoking status: Never     Passive exposure: Never    Smokeless tobacco: Never   Vaping Use    Vaping Use: Never used   Substance and Sexual Activity    Alcohol use: No    Drug use: No    Sexual activity: Yes     Partners: Male   Other Topics Concern    Not on file   Social History Narrative    Not on file     Social Determinants of Health     Financial Resource Strain: Not on file   Food Insecurity: Not on file (9/4/2023)   Transportation Needs: Not on file   Physical Activity: Not on file   Stress: Not on file   Social Connections: Not on file   Intimate Partner Violence: Not on file   Housing Stability: Not on file     Family History   Problem Relation Age of Onset    Hypertension Maternal Grandfather     Elevated Lipids Paternal Grandfather     Cancer Paternal Grandfather         stomach    Hypertension Paternal Grandfather     Thyroid Cancer Maternal Grandmother      /66   Wt 65.3 kg (144 lb)   LMP 02/04/2024   BMI 25.51 kg/m²      ROS:  Constitutional:

## 2024-05-05 LAB
BACTERIA SPEC CULT: NORMAL
SERVICE CMNT-IMP: NORMAL

## 2024-05-11 LAB
C TRACH RRNA CVX QL NAA+PROBE: NEGATIVE
COLLECTION METHOD: ABNORMAL
CYTOLOGIST CVX/VAG CYTO: ABNORMAL
CYTOLOGY CVX/VAG DOC THIN PREP: ABNORMAL
HPV APTIMA: POSITIVE
HPV GENOTYPE 18,45: POSITIVE
HPV GENOTYPE REFLEX: ABNORMAL
HPV, GENOTYPE 16: NEGATIVE
Lab: ABNORMAL
N GONORRHOEA RRNA CVX QL NAA+PROBE: NEGATIVE
OTHER PT INFO: ABNORMAL
PAP SOURCE: ABNORMAL
PATH REPORT.FINAL DX SPEC: ABNORMAL
PREV CYTO INFO: ABNORMAL
PREV TREATMENT RESULTS: ABNORMAL
PREV TREATMENT: ABNORMAL
STAT OF ADQ CVX/VAG CYTO-IMP: ABNORMAL
T VAGINALIS RRNA SPEC QL NAA+PROBE: NEGATIVE

## 2024-06-05 ENCOUNTER — ROUTINE PRENATAL (OUTPATIENT)
Dept: OBGYN CLINIC | Age: 24
End: 2024-06-05

## 2024-06-05 VITALS — BODY MASS INDEX: 26.22 KG/M2 | SYSTOLIC BLOOD PRESSURE: 110 MMHG | DIASTOLIC BLOOD PRESSURE: 64 MMHG | WEIGHT: 148 LBS

## 2024-06-05 DIAGNOSIS — R87.810 CERVICAL HIGH RISK HUMAN PAPILLOMAVIRUS (HPV) DNA TEST POSITIVE: ICD-10-CM

## 2024-06-05 DIAGNOSIS — Z34.02 SUPERVISION OF NORMAL FIRST PREGNANCY IN SECOND TRIMESTER: Primary | ICD-10-CM

## 2024-06-05 DIAGNOSIS — Z67.91 RH NEGATIVE STATUS DURING PREGNANCY IN SECOND TRIMESTER, ANTEPARTUM: ICD-10-CM

## 2024-06-05 DIAGNOSIS — O26.892 RH NEGATIVE STATUS DURING PREGNANCY IN SECOND TRIMESTER, ANTEPARTUM: ICD-10-CM

## 2024-06-05 PROCEDURE — 0502F SUBSEQUENT PRENATAL CARE: CPT | Performed by: OBSTETRICS & GYNECOLOGY

## 2024-06-05 NOTE — PROGRESS NOTES
Patient presents today for   Chief Complaint   Patient presents with    Routine Prenatal Visit     No VB, no LOF. No major OB complaints. +FM.    No Known Allergies  Current Outpatient Medications   Medication Sig Dispense Refill    Prenatal Vit-Fe Fumarate-FA (PRENATAL PO) Take by mouth      acyclovir (ZOVIRAX) 800 MG tablet Take 1 tablet by mouth 2 times daily (Patient not taking: Reported on 6/5/2024) 30 tablet 5     No current facility-administered medications for this visit.     Past Medical History:   Diagnosis Date    Chlamydia 1015/20    HSV-2 infection     Ill-defined condition     Biliary Dyskinesia     Past Surgical History:   Procedure Laterality Date    GALLBLADDER SURGERY  2015       /62   Wt 67.1 kg (148 lb)   LMP 02/04/2024   BMI 26.22 kg/m²          FHTs: 140s-150s       1. Supervision of normal first pregnancy in second trimester    2. Rh negative status during pregnancy in second trimester, antepartum    3. Cervical high risk human papillomavirus (HPV) DNA test positive      No orders of the defined types were placed in this encounter.    No orders of the defined types were placed in this encounter.    22yo G1 at 17w3d for NELLY:    Reviewed PNL. Rh negative. Antibody neg. NIPT Low risk. Carrier screen neg.   Rh neg - discussed. Will plan rhogam at 28 wks and pp unless issues arise.  Abnormal pap in Sept 2023, pap repeated at 12 wks and pap is normal, but +HR HPV 18 & 45 (16 is negative). Recommend proceeding with colposcopy (no biopsy planned due to pregnancy but will repeat pap +/- colposcopy pp as well).  Routine OB counseling reviewed. Recommend 81mg aspirin daily now through 36wks.  RTC 4wks for NELLY w/ anatomy US.  Pt will need separate appt for colposcopy evaluation.         Return in about 4 weeks (around 7/3/2024), or if symptoms worsen or fail to improve, for Anatomy US then NELLY.

## 2024-06-12 PROBLEM — Z34.02 SUPERVISION OF NORMAL FIRST PREGNANCY IN SECOND TRIMESTER: Status: ACTIVE | Noted: 2024-06-12

## 2024-06-12 PROBLEM — Z67.91 RH NEGATIVE STATUS DURING PREGNANCY IN SECOND TRIMESTER, ANTEPARTUM: Status: ACTIVE | Noted: 2024-06-12

## 2024-06-12 PROBLEM — O26.892 RH NEGATIVE STATUS DURING PREGNANCY IN SECOND TRIMESTER, ANTEPARTUM: Status: ACTIVE | Noted: 2024-06-12

## 2024-06-12 PROBLEM — R87.810 CERVICAL HIGH RISK HUMAN PAPILLOMAVIRUS (HPV) DNA TEST POSITIVE: Status: ACTIVE | Noted: 2024-06-12

## 2024-07-09 ENCOUNTER — ROUTINE PRENATAL (OUTPATIENT)
Dept: OBGYN CLINIC | Age: 24
End: 2024-07-09

## 2024-07-09 VITALS — DIASTOLIC BLOOD PRESSURE: 72 MMHG | BODY MASS INDEX: 27.1 KG/M2 | SYSTOLIC BLOOD PRESSURE: 126 MMHG | WEIGHT: 153 LBS

## 2024-07-09 DIAGNOSIS — O26.892 RH NEGATIVE STATUS DURING PREGNANCY IN SECOND TRIMESTER, ANTEPARTUM: ICD-10-CM

## 2024-07-09 DIAGNOSIS — Z34.02 SUPERVISION OF NORMAL FIRST PREGNANCY IN SECOND TRIMESTER: Primary | ICD-10-CM

## 2024-07-09 DIAGNOSIS — Z67.91 RH NEGATIVE STATUS DURING PREGNANCY IN SECOND TRIMESTER, ANTEPARTUM: ICD-10-CM

## 2024-07-09 DIAGNOSIS — R87.810 CERVICAL HIGH RISK HUMAN PAPILLOMAVIRUS (HPV) DNA TEST POSITIVE: ICD-10-CM

## 2024-07-09 DIAGNOSIS — O44.40 LOW-LYING PLACENTA: ICD-10-CM

## 2024-07-09 PROCEDURE — 0501F PRENATAL FLOW SHEET: CPT | Performed by: OBSTETRICS & GYNECOLOGY

## 2024-07-09 NOTE — PROGRESS NOTES
Patient presents today for   Chief Complaint   Patient presents with    Routine Prenatal Visit     No VB, no LOF.  +FM.     No Known Allergies  Current Outpatient Medications   Medication Sig Dispense Refill    Prenatal Vit-Fe Fumarate-FA (PRENATAL PO) Take by mouth      acyclovir (ZOVIRAX) 800 MG tablet Take 1 tablet by mouth 2 times daily (Patient not taking: Reported on 6/5/2024) 30 tablet 5     No current facility-administered medications for this visit.     Past Medical History:   Diagnosis Date    Chlamydia 1015/20    HSV-2 infection     Ill-defined condition     Biliary Dyskinesia     Past Surgical History:   Procedure Laterality Date    GALLBLADDER SURGERY  2015       /72   Wt 69.4 kg (153 lb)   LMP 02/04/2024   BMI 27.10 kg/m²      Ultrasound today:  See scanned report for full details.   All images viewed, read and interpreted by this MD.  , 1lb 4 oz, 56%ile  AC 79%ile  FL 25%ile  CL WNL (4+cm)  Anatomy seen & all views completed.  Low lying placenta (1.9cm away)   Male fetus  Breech  3VC  Normal appearing AF level  Impression/Interpretation: Normal 2nd trimester ultrasound with complete anatomy views and approp growth for 22wk gestation with low lying placenta noted.   Plan: Repeat US for placenta check in 4wks  Mehnaz Berg MD FACOG             1. Supervision of normal first pregnancy in second trimester    2. Rh negative status during pregnancy in second trimester, antepartum    3. Cervical high risk human papillomavirus (HPV) DNA test positive    4. Low-lying placenta      No orders of the defined types were placed in this encounter.    No orders of the defined types were placed in this encounter.    24yo G1 at 22w2d for NELLY:    Anatomy US was somehow not scheduled with her appt today, will work in for this given she is already 22 weeks along. See interpretation above.    Reviewed PNL. Rh negative. Antibody neg. NIPT Low risk. Carrier screen neg.   Rh neg - discussed. Will plan

## 2024-07-16 PROBLEM — O44.40 LOW-LYING PLACENTA: Status: ACTIVE | Noted: 2024-07-16

## 2024-08-07 ENCOUNTER — ROUTINE PRENATAL (OUTPATIENT)
Dept: OBGYN CLINIC | Age: 24
End: 2024-08-07
Payer: MEDICAID

## 2024-08-07 ENCOUNTER — PROCEDURE VISIT (OUTPATIENT)
Dept: OBGYN CLINIC | Age: 24
End: 2024-08-07
Payer: MEDICAID

## 2024-08-07 VITALS — SYSTOLIC BLOOD PRESSURE: 115 MMHG | WEIGHT: 155 LBS | DIASTOLIC BLOOD PRESSURE: 70 MMHG | BODY MASS INDEX: 27.46 KG/M2

## 2024-08-07 DIAGNOSIS — O26.892 RH NEGATIVE STATUS DURING PREGNANCY IN SECOND TRIMESTER, ANTEPARTUM: ICD-10-CM

## 2024-08-07 DIAGNOSIS — O44.40 LOW-LYING PLACENTA: Primary | ICD-10-CM

## 2024-08-07 DIAGNOSIS — Z67.91 RH NEGATIVE STATUS DURING PREGNANCY IN SECOND TRIMESTER, ANTEPARTUM: ICD-10-CM

## 2024-08-07 DIAGNOSIS — Z34.02 SUPERVISION OF NORMAL FIRST PREGNANCY IN SECOND TRIMESTER: Primary | ICD-10-CM

## 2024-08-07 DIAGNOSIS — O44.40 LOW-LYING PLACENTA: ICD-10-CM

## 2024-08-07 PROCEDURE — 99213 OFFICE O/P EST LOW 20 MIN: CPT | Performed by: OBSTETRICS & GYNECOLOGY

## 2024-08-07 PROCEDURE — 76817 TRANSVAGINAL US OBSTETRIC: CPT | Performed by: OBSTETRICS & GYNECOLOGY

## 2024-08-21 ENCOUNTER — ROUTINE PRENATAL (OUTPATIENT)
Dept: OBGYN CLINIC | Age: 24
End: 2024-08-21
Payer: MEDICAID

## 2024-08-21 VITALS — SYSTOLIC BLOOD PRESSURE: 124 MMHG | DIASTOLIC BLOOD PRESSURE: 70 MMHG | WEIGHT: 159 LBS | BODY MASS INDEX: 28.17 KG/M2

## 2024-08-21 DIAGNOSIS — Z13.0 SCREENING, ANEMIA, DEFICIENCY, IRON: ICD-10-CM

## 2024-08-21 DIAGNOSIS — Z34.02 SUPERVISION OF NORMAL FIRST PREGNANCY IN SECOND TRIMESTER: Primary | ICD-10-CM

## 2024-08-21 DIAGNOSIS — Z67.91 RH NEGATIVE STATUS DURING PREGNANCY IN SECOND TRIMESTER, ANTEPARTUM: ICD-10-CM

## 2024-08-21 DIAGNOSIS — O44.40 LOW-LYING PLACENTA: ICD-10-CM

## 2024-08-21 DIAGNOSIS — Z13.1 SCREENING FOR DIABETES MELLITUS (DM): ICD-10-CM

## 2024-08-21 DIAGNOSIS — Z3A.28 28 WEEKS GESTATION OF PREGNANCY: ICD-10-CM

## 2024-08-21 DIAGNOSIS — R87.810 CERVICAL HIGH RISK HUMAN PAPILLOMAVIRUS (HPV) DNA TEST POSITIVE: ICD-10-CM

## 2024-08-21 DIAGNOSIS — O26.892 RH NEGATIVE STATUS DURING PREGNANCY IN SECOND TRIMESTER, ANTEPARTUM: ICD-10-CM

## 2024-08-21 LAB
BLOOD GROUP ANTIBODIES SERPL: NORMAL
ERYTHROCYTE [DISTWIDTH] IN BLOOD BY AUTOMATED COUNT: 13 % (ref 11.9–14.6)
GLUCOSE 1 HOUR: 102 MG/DL
HCT VFR BLD AUTO: 38.6 % (ref 35.8–46.3)
HGB BLD-MCNC: 13.4 G/DL (ref 11.7–15.4)
MCH RBC QN AUTO: 33.7 PG (ref 26.1–32.9)
MCHC RBC AUTO-ENTMCNC: 34.7 G/DL (ref 31.4–35)
MCV RBC AUTO: 97 FL (ref 82–102)
NRBC # BLD: 0 K/UL (ref 0–0.2)
PLATELET # BLD AUTO: 250 K/UL (ref 150–450)
PMV BLD AUTO: 9.4 FL (ref 9.4–12.3)
RBC # BLD AUTO: 3.98 M/UL (ref 4.05–5.2)
WBC # BLD AUTO: 11.8 K/UL (ref 4.3–11.1)

## 2024-08-21 PROCEDURE — 99213 OFFICE O/P EST LOW 20 MIN: CPT | Performed by: NURSE PRACTITIONER

## 2024-08-21 NOTE — PATIENT INSTRUCTIONS
PTL/labor precautions, FMC, and pregnancy warning signs reviewed. Pt advised to call the office at [phone] or go straight to Labor and Delivery at [facility] with any of the following concerns vaginal bleeding, leaking of fluid, opal regularly Q 5-7 minutes for over an hour or not feeling the baby move.     Kick counts and pre-term labor precautions reviewed

## 2024-08-21 NOTE — PROGRESS NOTES
OB return  Elinor Blanchard is a 24 y.o. female  with 28w3d IUP with Estimated Date of Delivery: 11/10/24 presenting to the clinic as a routine OB.     She reports experiencing [place] rosas contractions however, denies feeling them regularly.  She denies leakage of fluid or vaginal bleeding and reports active fetal movement.     Denies HA, blurred vision or RUQ pain.     FH: 26.5cm  FHTs: 130s    Problem list reviewed and updated    Pregnancy complicated by:  1. Rh negative: will plan rhogam at 28 weeks    2. Low lying placenta: U/s : Low lying placenta (1.9cm away)     Physical Examination:  /70   Wt 72.1 kg (159 lb)   LMP 2024   BMI 28.17 kg/m²    Gen: AAOx3  Ab: soft, NTTP, gravid uterus  Skin: no edema    Plan:  --1 hour gtt, Cbc and Ab Screen collected today  --Rhogam given due to Rh negative  --Discussed Tdap vaccine and Flu Vaccine recommendations today.   --Encouraged increase in water intake daily to prevent dehydration.   --RTC 2 weeks for routine ob visit.     PTL/labor precautions, FMC, and pregnancy warning signs reviewed. Pt advised to call the office at [phone] or go straight to Labor and Delivery at [facility] with any of the following concerns vaginal bleeding, leaking of fluid, opal regularly Q 5-7 minutes for over an hour or not feeling the baby move.     Kick counts and pre-term labor precautions reviewed.

## 2024-09-03 ENCOUNTER — ROUTINE PRENATAL (OUTPATIENT)
Dept: OBGYN CLINIC | Age: 24
End: 2024-09-03
Payer: MEDICAID

## 2024-09-03 VITALS — BODY MASS INDEX: 28.34 KG/M2 | SYSTOLIC BLOOD PRESSURE: 120 MMHG | DIASTOLIC BLOOD PRESSURE: 70 MMHG | WEIGHT: 160 LBS

## 2024-09-03 DIAGNOSIS — Z67.91 RH NEGATIVE STATE IN ANTEPARTUM PERIOD, THIRD TRIMESTER: ICD-10-CM

## 2024-09-03 DIAGNOSIS — Z34.03 SUPERVISION OF NORMAL FIRST PREGNANCY IN THIRD TRIMESTER: Primary | ICD-10-CM

## 2024-09-03 DIAGNOSIS — O26.893 RH NEGATIVE STATE IN ANTEPARTUM PERIOD, THIRD TRIMESTER: ICD-10-CM

## 2024-09-03 PROCEDURE — 99213 OFFICE O/P EST LOW 20 MIN: CPT | Performed by: OBSTETRICS & GYNECOLOGY

## 2024-09-19 ENCOUNTER — PROCEDURE VISIT (OUTPATIENT)
Dept: OBGYN CLINIC | Age: 24
End: 2024-09-19
Payer: MEDICAID

## 2024-09-19 ENCOUNTER — ROUTINE PRENATAL (OUTPATIENT)
Dept: OBGYN CLINIC | Age: 24
End: 2024-09-19

## 2024-09-19 VITALS — SYSTOLIC BLOOD PRESSURE: 114 MMHG | DIASTOLIC BLOOD PRESSURE: 68 MMHG | WEIGHT: 162 LBS | BODY MASS INDEX: 28.7 KG/M2

## 2024-09-19 DIAGNOSIS — Z34.03 SUPERVISION OF NORMAL FIRST PREGNANCY IN THIRD TRIMESTER: Primary | ICD-10-CM

## 2024-09-19 DIAGNOSIS — O44.40 LOW-LYING PLACENTA: ICD-10-CM

## 2024-09-19 DIAGNOSIS — Z34.02 SUPERVISION OF NORMAL FIRST PREGNANCY IN SECOND TRIMESTER: Primary | ICD-10-CM

## 2024-09-19 PROCEDURE — 76816 OB US FOLLOW-UP PER FETUS: CPT | Performed by: OBSTETRICS & GYNECOLOGY

## 2024-10-07 ENCOUNTER — ROUTINE PRENATAL (OUTPATIENT)
Dept: OBGYN CLINIC | Age: 24
End: 2024-10-07
Payer: MEDICAID

## 2024-10-07 VITALS — DIASTOLIC BLOOD PRESSURE: 68 MMHG | BODY MASS INDEX: 28.87 KG/M2 | SYSTOLIC BLOOD PRESSURE: 110 MMHG | WEIGHT: 163 LBS

## 2024-10-07 DIAGNOSIS — R87.810 CERVICAL HIGH RISK HUMAN PAPILLOMAVIRUS (HPV) DNA TEST POSITIVE: ICD-10-CM

## 2024-10-07 DIAGNOSIS — Z67.91 RH NEGATIVE STATE IN ANTEPARTUM PERIOD, THIRD TRIMESTER: ICD-10-CM

## 2024-10-07 DIAGNOSIS — Z34.03 SUPERVISION OF NORMAL FIRST PREGNANCY IN THIRD TRIMESTER: Primary | ICD-10-CM

## 2024-10-07 DIAGNOSIS — O44.40 LOW-LYING PLACENTA: ICD-10-CM

## 2024-10-07 DIAGNOSIS — Z3A.35 35 WEEKS GESTATION OF PREGNANCY: ICD-10-CM

## 2024-10-07 DIAGNOSIS — Z23 NEED FOR DIPHTHERIA-TETANUS-PERTUSSIS (TDAP) VACCINE: ICD-10-CM

## 2024-10-07 DIAGNOSIS — O26.893 RH NEGATIVE STATE IN ANTEPARTUM PERIOD, THIRD TRIMESTER: ICD-10-CM

## 2024-10-07 PROCEDURE — 90715 TDAP VACCINE 7 YRS/> IM: CPT | Performed by: NURSE PRACTITIONER

## 2024-10-07 PROCEDURE — 99213 OFFICE O/P EST LOW 20 MIN: CPT | Performed by: NURSE PRACTITIONER

## 2024-10-07 PROCEDURE — 90471 IMMUNIZATION ADMIN: CPT | Performed by: NURSE PRACTITIONER

## 2024-10-07 NOTE — PATIENT INSTRUCTIONS
PTL/labor precautions, FMC, and pregnancy warning signs reviewed. Pt advised to call the office at [phone] or go straight to Labor and Delivery at [facility] with any of the following concerns vaginal bleeding, leaking of fluid, opal regularly Q 5-7 minutes for over an hour or not feeling the baby move.     Kick counts and pre-term labor precautions reviewed

## 2024-10-07 NOTE — PROGRESS NOTES
OB return  Elinor Blanchard is a 24 y.o. female  with 35w1d IUP with Estimated Date of Delivery: 11/10/24 presenting to the clinic as a routine OB.     She reports experiencing \"cramping\" type abdominal pains occasionally however, denies feeling them regularly. She denies leakage of fluid or vaginal bleeding and reports active fetal movement.      Denies HA, blurred vision or RUQ pain.      FHTs: 140s    Problem list reviewed and updated    Pregnancy complicated by:  1. Rh negative: s/p rhogam     2. Low lying placenta: U/s : Low lying placenta (1.9cm away). 24: Posterior placenta - low lying but has moved 2.9-3.0 cm away.     3. Abnormal pap: Abnormal pap in 2023, pap repeated at 12 wks and pap is normal, but +HR HPV 18 & 45 (16 is negative). Recommend proceeding with colposcopy (no biopsy planned due to pregnancy) however, discussed r/b/a w/ pt and will plan to repeat pap +/- colposcopy postpartum.     Physical Examination:  /68   Wt 73.9 kg (163 lb)   LMP 2024   BMI 28.87 kg/m²    Gen: AAOx3  Ab: soft, NTTP, gravid uterus  Skin: no edema    Plan:  --Encouraged increase in water intake daily to prevent dehydration.   --Discussed Tdap vaccine recommendations with patient. She does wish to receive.   --Tdap given to patient during visit today.   --RTC 1 weeks for margareth with u/s to assess low lying placenta as scheduled. Will plan to collect gbs at that visit.     PTL/labor precautions, FMC, and pregnancy warning signs reviewed. Pt advised to call the office at [phone] or go straight to Labor and Delivery at [facility] with any of the following concerns vaginal bleeding, leaking of fluid, opal regularly Q 5-7 minutes for over an hour or not feeling the baby move.      Kick counts and pre-term labor precautions reviewed.

## 2024-10-17 ENCOUNTER — ROUTINE PRENATAL (OUTPATIENT)
Dept: OBGYN CLINIC | Age: 24
End: 2024-10-17
Payer: MEDICAID

## 2024-10-17 ENCOUNTER — PROCEDURE VISIT (OUTPATIENT)
Dept: OBGYN CLINIC | Age: 24
End: 2024-10-17
Payer: MEDICAID

## 2024-10-17 VITALS — SYSTOLIC BLOOD PRESSURE: 112 MMHG | BODY MASS INDEX: 29.23 KG/M2 | WEIGHT: 165 LBS | DIASTOLIC BLOOD PRESSURE: 70 MMHG

## 2024-10-17 DIAGNOSIS — Z3A.36 36 WEEKS GESTATION OF PREGNANCY: ICD-10-CM

## 2024-10-17 DIAGNOSIS — O98.319 LATENT GENITAL HERPES SIMPLEX (HSV) IN MOTHER DURING PREGNANCY: ICD-10-CM

## 2024-10-17 DIAGNOSIS — Z34.03 SUPERVISION OF NORMAL FIRST PREGNANCY IN THIRD TRIMESTER: Primary | ICD-10-CM

## 2024-10-17 DIAGNOSIS — A60.09 LATENT GENITAL HERPES SIMPLEX (HSV) IN MOTHER DURING PREGNANCY: ICD-10-CM

## 2024-10-17 DIAGNOSIS — O26.893 RH NEGATIVE STATE IN ANTEPARTUM PERIOD, THIRD TRIMESTER: ICD-10-CM

## 2024-10-17 DIAGNOSIS — O44.40 LOW-LYING PLACENTA: ICD-10-CM

## 2024-10-17 DIAGNOSIS — Z67.91 RH NEGATIVE STATE IN ANTEPARTUM PERIOD, THIRD TRIMESTER: ICD-10-CM

## 2024-10-17 DIAGNOSIS — Z36.85 SCREENING, ANTENATAL, FOR STREPTOCOCCUS B: ICD-10-CM

## 2024-10-17 PROCEDURE — 99213 OFFICE O/P EST LOW 20 MIN: CPT | Performed by: OBSTETRICS & GYNECOLOGY

## 2024-10-17 PROCEDURE — 76816 OB US FOLLOW-UP PER FETUS: CPT | Performed by: OBSTETRICS & GYNECOLOGY

## 2024-10-17 RX ORDER — VALACYCLOVIR HYDROCHLORIDE 1 G/1
1000 TABLET, FILM COATED ORAL DAILY
Qty: 30 TABLET | Refills: 1 | Status: SHIPPED | OUTPATIENT
Start: 2024-10-17

## 2024-10-17 NOTE — PROGRESS NOTES
Patient presents today for   Chief Complaint   Patient presents with    Pregnancy Ultrasound    Routine Prenatal Visit     No VB, no LOF.  +FM. +occas runs of ctx, nothing major yet at this point.    No Known Allergies  Current Outpatient Medications   Medication Sig Dispense Refill    valACYclovir (VALTREX) 1 g tablet Take 1 tablet by mouth daily 30 tablet 1    Prenatal Vit-Fe Fumarate-FA (PRENATAL PO) Take by mouth      acyclovir (ZOVIRAX) 800 MG tablet Take 1 tablet by mouth 2 times daily (Patient not taking: Reported on 2024) 30 tablet 5     No current facility-administered medications for this visit.     Past Medical History:   Diagnosis Date    Chlamydia     HSV-2 infection     Ill-defined condition     Biliary Dyskinesia     Past Surgical History:   Procedure Laterality Date    GALLBLADDER SURGERY         /70   Wt 74.8 kg (165 lb)   LMP 2024   BMI 29.23 kg/m²        Ultrasound today:  See scanned report for full details.   All images viewed, read and interpreted by this MD.  EFW 3052g, 6 lbs 12 oz, 57%ile  AC 98%ile  BENI 19, ADVP 5.4  FHR 132bpm  Cephalic  Posterior R lateral placenta - low lying resolved to over 3cm away  BPP 8/8   Anatomy seen and is complete.  Impression/Interpretation: Normal 3rd trimester US with appropriate growth for 36 wk gestation and low lying placenta that resolved to more than 3cm away.   Plan: Repeat ultrasound in 4 weeks to recheck placenta and follow up AC.  Mehnaz Berg MD FACOG    SVE: No HSV lesions. GBS collected. Cervix soft, mid position, ~70% effaced. Cephalic.     1. Supervision of normal first pregnancy in third trimester    2. Rh negative state in antepartum period, third trimester    3. Low-lying placenta    4. Screening, , for Streptococcus B    5. 36 weeks gestation of pregnancy    6. Latent genital herpes simplex (HSV) in mother during pregnancy      Orders Placed This Encounter   Procedures    Culture, Strep B Screen,

## 2024-10-20 LAB
BACTERIA SPEC CULT: NORMAL
SERVICE CMNT-IMP: NORMAL

## 2024-10-23 ENCOUNTER — ROUTINE PRENATAL (OUTPATIENT)
Dept: OBGYN CLINIC | Age: 24
End: 2024-10-23

## 2024-10-23 VITALS — SYSTOLIC BLOOD PRESSURE: 118 MMHG | BODY MASS INDEX: 29.05 KG/M2 | DIASTOLIC BLOOD PRESSURE: 68 MMHG | WEIGHT: 164 LBS

## 2024-10-23 DIAGNOSIS — Z3A.38 38 WEEKS GESTATION OF PREGNANCY: ICD-10-CM

## 2024-10-23 DIAGNOSIS — O44.40 LOW-LYING PLACENTA: ICD-10-CM

## 2024-10-23 DIAGNOSIS — R87.810 CERVICAL HIGH RISK HUMAN PAPILLOMAVIRUS (HPV) DNA TEST POSITIVE: ICD-10-CM

## 2024-10-23 DIAGNOSIS — O26.893 RH NEGATIVE STATUS DURING PREGNANCY IN THIRD TRIMESTER: ICD-10-CM

## 2024-10-23 DIAGNOSIS — Z34.03 SUPERVISION OF NORMAL FIRST PREGNANCY IN THIRD TRIMESTER: Primary | ICD-10-CM

## 2024-10-23 DIAGNOSIS — Z67.91 RH NEGATIVE STATUS DURING PREGNANCY IN THIRD TRIMESTER: ICD-10-CM

## 2024-10-23 NOTE — PROGRESS NOTES
OB return  Elinor Blanchard is a 24 y.o. female   with 37w3d IUP with Estimated Date of Delivery: 11/10/24 presenting to the clinic as a routine OB.     She reports experiencing \"cramping type pains\" however, denies feeling consistently. She denies leakage of fluid or vaginal bleeding and reports active fetal movement.      Denies HA, blurred vision or RUQ pain.     FHTs: 140s    Problem list reviewed and updated    Pregnancy complicated by:  1. Rh negative: s/p rhogam     2. Low lying placenta: U/s : Low lying placenta (1.9cm away). 24: Posterior placenta - low lying but has moved 2.9-3.0 cm away.      3. Abnormal pap: Abnormal pap in 2023, pap repeated at 12 wks and pap is normal, but +HR HPV 18 & 45 (16 is negative). Recommend proceeding with colposcopy (no biopsy planned due to pregnancy) however, discussed r/b/a w/ pt and will plan to repeat pap +/- colposcopy postpartum.     Physical Examination:  /68   Wt 74.4 kg (164 lb)   LMP 2024   BMI 29.05 kg/m²    Gen: AAOx3  Ab: soft, NTTP, gravid uterus  Skin: no edema  SVE: ft/70/-3    Plan:  --GBS neg, discussed results with patient today.  --SVE: /-3  --RTC 1 weeks for margareth    PTL/labor precautions, FMC, and pregnancy warning signs reviewed. Pt advised to call the office at [phone] or go straight to Labor and Delivery at [facility] with any of the following concerns vaginal bleeding, leaking of fluid, opal regularly Q 5-7 minutes for over an hour or not feeling the baby move.     Kick counts and labor precautions reviewed

## 2024-10-30 ENCOUNTER — ROUTINE PRENATAL (OUTPATIENT)
Dept: OBGYN CLINIC | Age: 24
End: 2024-10-30
Payer: MEDICAID

## 2024-10-30 VITALS — SYSTOLIC BLOOD PRESSURE: 120 MMHG | WEIGHT: 165 LBS | DIASTOLIC BLOOD PRESSURE: 76 MMHG | BODY MASS INDEX: 29.23 KG/M2

## 2024-10-30 DIAGNOSIS — Z34.03 SUPERVISION OF NORMAL FIRST PREGNANCY IN THIRD TRIMESTER: Primary | ICD-10-CM

## 2024-10-30 DIAGNOSIS — Z67.91 RH NEGATIVE STATUS DURING PREGNANCY IN THIRD TRIMESTER: ICD-10-CM

## 2024-10-30 DIAGNOSIS — R87.810 CERVICAL HIGH RISK HUMAN PAPILLOMAVIRUS (HPV) DNA TEST POSITIVE: ICD-10-CM

## 2024-10-30 DIAGNOSIS — Z3A.38 38 WEEKS GESTATION OF PREGNANCY: ICD-10-CM

## 2024-10-30 DIAGNOSIS — O44.40 LOW-LYING PLACENTA: ICD-10-CM

## 2024-10-30 DIAGNOSIS — O26.893 RH NEGATIVE STATUS DURING PREGNANCY IN THIRD TRIMESTER: ICD-10-CM

## 2024-10-30 PROCEDURE — 99213 OFFICE O/P EST LOW 20 MIN: CPT | Performed by: NURSE PRACTITIONER

## 2024-10-30 SDOH — ECONOMIC STABILITY: FOOD INSECURITY: WITHIN THE PAST 12 MONTHS, THE FOOD YOU BOUGHT JUST DIDN'T LAST AND YOU DIDN'T HAVE MONEY TO GET MORE.: NEVER TRUE

## 2024-10-30 SDOH — ECONOMIC STABILITY: INCOME INSECURITY: HOW HARD IS IT FOR YOU TO PAY FOR THE VERY BASICS LIKE FOOD, HOUSING, MEDICAL CARE, AND HEATING?: NOT VERY HARD

## 2024-10-30 SDOH — ECONOMIC STABILITY: FOOD INSECURITY: WITHIN THE PAST 12 MONTHS, YOU WORRIED THAT YOUR FOOD WOULD RUN OUT BEFORE YOU GOT MONEY TO BUY MORE.: NEVER TRUE

## 2024-10-30 NOTE — PROGRESS NOTES
OB return  Elinor Blanchard is a 24 y.o. female   with 38w3d IUP with Estimated Date of Delivery: 11/10/24 presenting to the clinic as a routine OB.     She reports experiencing contractions this morning every 15 minutes for 2 hours and then resolved.  She denies leakage of fluid or vaginal bleeding and reports active fetal movement.      Denies HA, blurred vision or RUQ pain.     FHTs: 130s    Problem list reviewed and updated    Pregnancy complicated by:  1. Rh negative: s/p rhogam     2. Low lying placenta: U/s : Low lying placenta (1.9cm away). 24: Posterior placenta - low lying but has moved 2.9-3.0 cm away.      3. Abnormal pap: Abnormal pap in 2023, pap repeated at 12 wks and pap is normal, but +HR HPV 18 & 45 (16 is negative). Recommend proceeding with colposcopy (no biopsy planned due to pregnancy) however, discussed r/b/a w/ pt and will plan to repeat pap +/- colposcopy postpartum.     Physical Examination:  /76   Wt 74.8 kg (165 lb)   LMP 2024   BMI 29.23 kg/m²    Gen: AAOx3  Ab: soft, NTTP, gravid uterus  Skin: no edema  SVE: FT/70/-3    Plan:  --SVE: ft/70/-3  --RTC 1 weeks for margareth    Strict PTL/labor precautions, FMC, and pregnancy warning signs reviewed. Pt advised to call the office at [phone] or go straight to Labor and Delivery at [facility] with any of the following concerns vaginal bleeding, leaking of fluid, opal regularly Q 5-7 minutes for over an hour or not feeling the baby move.     Strict Kick counts and labor precautions reviewed

## 2024-10-30 NOTE — PATIENT INSTRUCTIONS
PTL/labor precautions, FMC, and pregnancy warning signs reviewed. Pt advised to call the office at [phone] or go straight to Labor and Delivery at [facility] with any of the following concerns vaginal bleeding, leaking of fluid, opal regularly Q 5-7 minutes for over an hour or not feeling the baby move.     Kick counts and labor precautions reviewed

## 2024-11-08 ENCOUNTER — HOSPITAL ENCOUNTER (OUTPATIENT)
Age: 24
Discharge: HOME | DRG: 560 | End: 2024-11-08
Attending: OBSTETRICS & GYNECOLOGY | Admitting: OBSTETRICS & GYNECOLOGY
Payer: MEDICAID

## 2024-11-08 VITALS
DIASTOLIC BLOOD PRESSURE: 83 MMHG | TEMPERATURE: 97.9 F | RESPIRATION RATE: 16 BRPM | OXYGEN SATURATION: 97 % | HEART RATE: 91 BPM | SYSTOLIC BLOOD PRESSURE: 115 MMHG

## 2024-11-08 PROBLEM — Z3A.39 39 WEEKS GESTATION OF PREGNANCY: Status: ACTIVE | Noted: 2024-11-08

## 2024-11-08 PROCEDURE — 99282 EMERGENCY DEPT VISIT SF MDM: CPT

## 2024-11-08 PROCEDURE — 59025 FETAL NON-STRESS TEST: CPT

## 2024-11-08 NOTE — PROGRESS NOTES
Patient presents to OB ED with complaints of contractions. Triage assessment as documented.  made aware.

## 2024-11-08 NOTE — DISCHARGE INSTRUCTIONS
Early Stage of Labor at Home: Care Instructions  Overview     If you came to the hospital while in early labor, your doctor may ask you to labor at home until your contractions are stronger.  Many women stay at home during early labor. This is often the longest part of the birthing process. It may last up to 2 to 3 days. Contractions are mild to moderate and shorter (about 30 to 45 seconds). You can usually keep talking during them. Contractions may also be irregular, about 5 to 20 minutes apart. They may even stop for a while.  It helps to stay as relaxed as you can during this time. You can spend some or all of your early labor at home or anywhere else you may be comfortable. If you live far from the hospital or birthing center, you may want to think about going somewhere nearby so you can get back to the hospital quickly.  For some women, there may be benefits to staying home during early labor, such as avoiding medicines or procedures.  As labor progresses, you'll shift from early labor to active labor. During this time, contractions get more intense. They occur more often, about every 2 to 3 minutes. They also last longer, about 50 to 70 seconds. You will feel them even when you change positions and walk or move around.  It may be hard to tell if you are in active labor. If you aren't sure, call your doctor or midwife. As your labor progresses, check in with your doctor or midwife about when to come back to the hospital or birthing center. You may have special instructions if your water broke or you tested positive for group B strep.  Follow-up care is a key part of your treatment and safety. Be sure to make and go to all appointments, and call your doctor if you are having problems. It's also a good idea to know your test results and keep a list of the medicines you take.  How can you care for yourself at home?  Get support. Having a support person with you from early labor until after childbirth can have  a fever.     You have had regular contractions (with or without pain) for an hour. This means that you have 8 or more within 1 hour or 4 or more in 20 minutes after you change your position and drink fluids.     You have a sudden release of fluid from your vagina.     You have low back pain or pelvic pressure that does not go away.     You notice that your baby has stopped moving or is moving much less than normal.   Watch closely for changes in your health, and be sure to contact your doctor if you have any problems.  Where can you learn more?  Go to https://www.[employer].net/patientEd and enter W539 to learn more about \"Early Stage of Labor at Home: Care Instructions.\"  Current as of: July 10, 2023  Content Version: 14.2  © 2024 [employer].   Care instructions adapted under license by [employer]. If you have questions about a medical condition or this instruction, always ask your healthcare professional. [employer] disclaims any warranty or liability for your use of this information.

## 2024-11-08 NOTE — ED TRIAGE NOTES
Obstetrics History & Physical/OB ED note    Name: Elinor Blanchard MRN: [id number]     YOB: 2000  Age: 24 y.o.  Sex: female      Reason for Presentation:  contractions/possible labor    HPI: Elinor Blanchard is a 24 y.o.  female with Estimated Date of Delivery: 11/10/24 at 39w5d gestation presents to triage c/o contractions since about 5am.  Rates them /10.  Was checked earlier this past week at her visit in the office and was FT.  No bleeding no leaking.  +active Fms.     Her obstetrical history is significant for  low lying placenta -RESOLVED, rh negative, h/o HSV 2 no active lesions/symptoms .  Prenatal records reviewed.         Past History:  OB History    Para Term  AB Living   1   0 0 0 0   SAB IAB Ectopic Molar Multiple Live Births                    # Outcome Date GA Lbr Wale/2nd Weight Sex Type Anes PTL Lv   1 Current              Past Medical History:   Diagnosis Date    Chlamydia     HSV-2 infection     Ill-defined condition     Biliary Dyskinesia     Past Surgical History:   Procedure Laterality Date    GALLBLADDER SURGERY       Social History     Tobacco Use    Smoking status: Never     Passive exposure: Never    Smokeless tobacco: Never   Substance Use Topics    Alcohol use: No     Prior to Admission medications    Medication Sig Start Date End Date Taking? Authorizing Provider   valACYclovir (VALTREX) 1 g tablet Take 1 tablet by mouth daily 10/17/24   Mehnaz Berg MD   Prenatal Vit-Fe Fumarate-FA (PRENATAL PO) Take by mouth    ProviderRakesh MD   acyclovir (ZOVIRAX) 800 MG tablet Take 1 tablet by mouth 2 times daily  Patient not taking: Reported on 2024   Mehnaz Berg MD     No Known Allergies    Physical Exam:  VITALS:  /83   Pulse 91   Temp 97.9 °F (36.6 °C) (Oral)   Resp 16   LMP 2024   SpO2 97%   CONSTITUTIONAL:  awake, alert, cooperative, no apparent distress, and appears stated

## 2024-11-09 ENCOUNTER — HOSPITAL ENCOUNTER (INPATIENT)
Age: 24
LOS: 3 days | Discharge: HOME | DRG: 560 | End: 2024-11-12
Attending: OBSTETRICS & GYNECOLOGY | Admitting: OBSTETRICS & GYNECOLOGY
Payer: MEDICAID

## 2024-11-09 ENCOUNTER — ANESTHESIA EVENT (OUTPATIENT)
Dept: LABOR AND DELIVERY | Age: 24
End: 2024-11-09
Payer: MEDICAID

## 2024-11-09 ENCOUNTER — HOSPITAL ENCOUNTER (OUTPATIENT)
Age: 24
Discharge: HOME | DRG: 560 | End: 2024-11-09
Attending: OBSTETRICS & GYNECOLOGY | Admitting: OBSTETRICS & GYNECOLOGY
Payer: MEDICAID

## 2024-11-09 ENCOUNTER — ANESTHESIA (OUTPATIENT)
Dept: LABOR AND DELIVERY | Age: 24
End: 2024-11-09
Payer: MEDICAID

## 2024-11-09 VITALS
OXYGEN SATURATION: 98 % | SYSTOLIC BLOOD PRESSURE: 120 MMHG | HEART RATE: 82 BPM | TEMPERATURE: 98 F | DIASTOLIC BLOOD PRESSURE: 78 MMHG | RESPIRATION RATE: 16 BRPM

## 2024-11-09 PROBLEM — R10.9 ABDOMINAL PAIN IN PREGNANCY, THIRD TRIMESTER: Status: ACTIVE | Noted: 2024-11-09

## 2024-11-09 PROBLEM — Z37.9 NORMAL LABOR: Status: ACTIVE | Noted: 2024-11-09

## 2024-11-09 PROBLEM — O26.893 ABDOMINAL PAIN IN PREGNANCY, THIRD TRIMESTER: Status: ACTIVE | Noted: 2024-11-09

## 2024-11-09 LAB
ERYTHROCYTE [DISTWIDTH] IN BLOOD BY AUTOMATED COUNT: 13 % (ref 11.9–14.6)
HCT VFR BLD AUTO: 41.2 % (ref 35.8–46.3)
HGB BLD-MCNC: 14.5 G/DL (ref 11.7–15.4)
MCH RBC QN AUTO: 32.8 PG (ref 26.1–32.9)
MCHC RBC AUTO-ENTMCNC: 35.2 G/DL (ref 31.4–35)
MCV RBC AUTO: 93.2 FL (ref 82–102)
NRBC # BLD: 0 K/UL (ref 0–0.2)
PLATELET # BLD AUTO: 233 K/UL (ref 150–450)
PMV BLD AUTO: 10.2 FL (ref 9.4–12.3)
RBC # BLD AUTO: 4.42 M/UL (ref 4.05–5.2)
T PALLIDUM AB SER QL IA: NONREACTIVE
WBC # BLD AUTO: 13.6 K/UL (ref 4.3–11.1)

## 2024-11-09 PROCEDURE — 6360000002 HC RX W HCPCS

## 2024-11-09 PROCEDURE — 51701 INSERT BLADDER CATHETER: CPT

## 2024-11-09 PROCEDURE — 2580000003 HC RX 258: Performed by: OBSTETRICS & GYNECOLOGY

## 2024-11-09 PROCEDURE — 2500000003 HC RX 250 WO HCPCS

## 2024-11-09 PROCEDURE — 59025 FETAL NON-STRESS TEST: CPT

## 2024-11-09 PROCEDURE — 86780 TREPONEMA PALLIDUM: CPT

## 2024-11-09 PROCEDURE — 3700000025 EPIDURAL BLOCK: Performed by: ANESTHESIOLOGY

## 2024-11-09 PROCEDURE — 86850 RBC ANTIBODY SCREEN: CPT

## 2024-11-09 PROCEDURE — 6360000002 HC RX W HCPCS: Performed by: OBSTETRICS & GYNECOLOGY

## 2024-11-09 PROCEDURE — 86901 BLOOD TYPING SEROLOGIC RH(D): CPT

## 2024-11-09 PROCEDURE — 99284 EMERGENCY DEPT VISIT MOD MDM: CPT

## 2024-11-09 PROCEDURE — 86900 BLOOD TYPING SEROLOGIC ABO: CPT

## 2024-11-09 PROCEDURE — 85027 COMPLETE CBC AUTOMATED: CPT

## 2024-11-09 PROCEDURE — 96372 THER/PROPH/DIAG INJ SC/IM: CPT

## 2024-11-09 PROCEDURE — 6360000002 HC RX W HCPCS: Performed by: ANESTHESIOLOGY

## 2024-11-09 PROCEDURE — 3E0S3BZ INTRODUCTION OF ANESTHETIC AGENT INTO EPIDURAL SPACE, PERCUTANEOUS APPROACH: ICD-10-PCS | Performed by: ANESTHESIOLOGY

## 2024-11-09 PROCEDURE — 1100000000 HC RM PRIVATE

## 2024-11-09 PROCEDURE — 6370000000 HC RX 637 (ALT 250 FOR IP): Performed by: OBSTETRICS & GYNECOLOGY

## 2024-11-09 RX ORDER — SODIUM CHLORIDE, SODIUM LACTATE, POTASSIUM CHLORIDE, CALCIUM CHLORIDE 600; 310; 30; 20 MG/100ML; MG/100ML; MG/100ML; MG/100ML
INJECTION, SOLUTION INTRAVENOUS CONTINUOUS
Status: DISCONTINUED | OUTPATIENT
Start: 2024-11-09 | End: 2024-11-10

## 2024-11-09 RX ORDER — CARBOPROST TROMETHAMINE 250 UG/ML
250 INJECTION, SOLUTION INTRAMUSCULAR PRN
Status: DISCONTINUED | OUTPATIENT
Start: 2024-11-09 | End: 2024-11-10

## 2024-11-09 RX ORDER — ACETAMINOPHEN 500 MG
1000 TABLET ORAL ONCE
Status: COMPLETED | OUTPATIENT
Start: 2024-11-09 | End: 2024-11-09

## 2024-11-09 RX ORDER — ROPIVACAINE HYDROCHLORIDE 2 MG/ML
INJECTION, SOLUTION EPIDURAL; INFILTRATION; PERINEURAL
Status: DISCONTINUED | OUTPATIENT
Start: 2024-11-09 | End: 2024-11-10 | Stop reason: SDUPTHER

## 2024-11-09 RX ORDER — SODIUM CHLORIDE 0.9 % (FLUSH) 0.9 %
5-40 SYRINGE (ML) INJECTION EVERY 12 HOURS SCHEDULED
Status: DISCONTINUED | OUTPATIENT
Start: 2024-11-09 | End: 2024-11-10

## 2024-11-09 RX ORDER — SODIUM CHLORIDE, SODIUM LACTATE, POTASSIUM CHLORIDE, AND CALCIUM CHLORIDE .6; .31; .03; .02 G/100ML; G/100ML; G/100ML; G/100ML
500 INJECTION, SOLUTION INTRAVENOUS PRN
Status: DISCONTINUED | OUTPATIENT
Start: 2024-11-09 | End: 2024-11-10

## 2024-11-09 RX ORDER — METHYLERGONOVINE MALEATE 0.2 MG/ML
200 INJECTION INTRAVENOUS PRN
Status: DISCONTINUED | OUTPATIENT
Start: 2024-11-09 | End: 2024-11-10

## 2024-11-09 RX ORDER — TERBUTALINE SULFATE 1 MG/ML
0.25 INJECTION, SOLUTION SUBCUTANEOUS
Status: DISCONTINUED | OUTPATIENT
Start: 2024-11-09 | End: 2024-11-10

## 2024-11-09 RX ORDER — SODIUM CHLORIDE 9 MG/ML
INJECTION, SOLUTION INTRAVENOUS PRN
Status: DISCONTINUED | OUTPATIENT
Start: 2024-11-09 | End: 2024-11-10

## 2024-11-09 RX ORDER — EPHEDRINE SULFATE 5 MG/ML
INJECTION INTRAVENOUS
Status: DISCONTINUED | OUTPATIENT
Start: 2024-11-09 | End: 2024-11-10 | Stop reason: SDUPTHER

## 2024-11-09 RX ORDER — TRANEXAMIC ACID 10 MG/ML
1000 INJECTION, SOLUTION INTRAVENOUS
Status: DISCONTINUED | OUTPATIENT
Start: 2024-11-09 | End: 2024-11-10

## 2024-11-09 RX ORDER — SODIUM CHLORIDE, SODIUM LACTATE, POTASSIUM CHLORIDE, AND CALCIUM CHLORIDE .6; .31; .03; .02 G/100ML; G/100ML; G/100ML; G/100ML
1000 INJECTION, SOLUTION INTRAVENOUS PRN
Status: DISCONTINUED | OUTPATIENT
Start: 2024-11-09 | End: 2024-11-10

## 2024-11-09 RX ORDER — LIDOCAINE HYDROCHLORIDE AND EPINEPHRINE 15; 5 MG/ML; UG/ML
INJECTION, SOLUTION EPIDURAL
Status: DISCONTINUED | OUTPATIENT
Start: 2024-11-09 | End: 2024-11-10 | Stop reason: SDUPTHER

## 2024-11-09 RX ORDER — ONDANSETRON 2 MG/ML
4 INJECTION INTRAMUSCULAR; INTRAVENOUS EVERY 6 HOURS PRN
Status: DISCONTINUED | OUTPATIENT
Start: 2024-11-09 | End: 2024-11-10

## 2024-11-09 RX ORDER — ACETAMINOPHEN 325 MG/1
650 TABLET ORAL EVERY 4 HOURS PRN
Status: DISCONTINUED | OUTPATIENT
Start: 2024-11-09 | End: 2024-11-10

## 2024-11-09 RX ORDER — ONDANSETRON 4 MG/1
4 TABLET, ORALLY DISINTEGRATING ORAL EVERY 6 HOURS PRN
Status: DISCONTINUED | OUTPATIENT
Start: 2024-11-09 | End: 2024-11-10

## 2024-11-09 RX ORDER — SODIUM CHLORIDE 0.9 % (FLUSH) 0.9 %
5-40 SYRINGE (ML) INJECTION PRN
Status: DISCONTINUED | OUTPATIENT
Start: 2024-11-09 | End: 2024-11-10

## 2024-11-09 RX ADMIN — ACETAMINOPHEN 1000 MG: 500 TABLET, FILM COATED ORAL at 23:04

## 2024-11-09 RX ADMIN — ROPIVACAINE HYDROCHLORIDE 8 ML/HR: 2 INJECTION, SOLUTION EPIDURAL; INFILTRATION at 17:33

## 2024-11-09 RX ADMIN — BUTORPHANOL TARTRATE 1 MG: 2 INJECTION, SOLUTION INTRAMUSCULAR; INTRAVENOUS at 05:00

## 2024-11-09 RX ADMIN — ONDANSETRON 4 MG: 2 INJECTION, SOLUTION INTRAMUSCULAR; INTRAVENOUS at 23:20

## 2024-11-09 RX ADMIN — EPHEDRINE SULFATE 10 MG: 5 INJECTION INTRAVENOUS at 21:07

## 2024-11-09 RX ADMIN — SODIUM CHLORIDE, POTASSIUM CHLORIDE, SODIUM LACTATE AND CALCIUM CHLORIDE 75 ML/HR: 600; 310; 30; 20 INJECTION, SOLUTION INTRAVENOUS at 20:38

## 2024-11-09 RX ADMIN — LIDOCAINE HYDROCHLORIDE,EPINEPHRINE BITARTRATE 5 ML: 15; .005 INJECTION, SOLUTION EPIDURAL; INFILTRATION; INTRACAUDAL; PERINEURAL at 17:26

## 2024-11-09 RX ADMIN — EPHEDRINE SULFATE 10 MG: 5 INJECTION INTRAVENOUS at 17:55

## 2024-11-09 RX ADMIN — ROPIVACAINE HYDROCHLORIDE 8 ML: 2 INJECTION, SOLUTION EPIDURAL; INFILTRATION at 17:32

## 2024-11-09 ASSESSMENT — PAIN SCALES - GENERAL: PAINLEVEL_OUTOF10: 6

## 2024-11-09 ASSESSMENT — PAIN DESCRIPTION - LOCATION: LOCATION: ABDOMEN;BACK

## 2024-11-09 NOTE — ANESTHESIA PRE PROCEDURE
Department of Anesthesiology  Preprocedure Note       Name:  Elinor Blanchard   Age:  24 y.o.  :  2000                                          MRN:  [id number]         Date:  2024      Surgeon: * No surgeons listed *    Procedure: * No procedures listed *    Medications prior to admission:   Prior to Admission medications    Medication Sig Start Date End Date Taking? Authorizing Provider   valACYclovir (VALTREX) 1 g tablet Take 1 tablet by mouth daily 10/17/24  Yes Mehnaz Berg MD   Prenatal Vit-Fe Fumarate-FA (PRENATAL PO) Take by mouth   Yes Provider, MD Rakesh   acyclovir (ZOVIRAX) 800 MG tablet Take 1 tablet by mouth 2 times daily  Patient not taking: Reported on 2024   Mehnaz Berg MD       Current medications:    Current Facility-Administered Medications   Medication Dose Route Frequency Provider Last Rate Last Admin   • oxytocin (PITOCIN) 30 units in 500 mL infusion  1-20 dinesh-units/min IntraVENous Continuous Eugenia Moran MD       • terbutaline (BRETHINE) injection 0.25 mg  0.25 mg SubCUTAneous Once PRN Eugenia Moran MD       • lactated ringers infusion   IntraVENous Continuous Eugenia Moran MD       • lactated ringers bolus 500 mL  500 mL IntraVENous PRN Eugenia Moran MD        Or   • lactated ringers bolus 1,000 mL  1,000 mL IntraVENous PRN Eugenia Moran MD       • sodium chloride flush 0.9 % injection 5-40 mL  5-40 mL IntraVENous 2 times per day Eugenia Moran MD       • sodium chloride flush 0.9 % injection 5-40 mL  5-40 mL IntraVENous PRN Eugenia Moran MD       • 0.9 % sodium chloride infusion   IntraVENous PRN Eugenia Moran MD       • butorphanol (STADOL) injection 1 mg  1 mg IntraVENous Q3H PRN Eugenia Moran MD       • methylergonovine (METHERGINE) injection 200 mcg  200 mcg IntraMUSCular PRN Eugenia Moran MD       • carboprost (HEMABATE) injection 250 mcg  250 mcg IntraMUSCular PRN Eugenia Moran MD       • tranexamic acid-NaCl IVPB premix 1,000 mg

## 2024-11-09 NOTE — H&P
Obstetrics History & Physical/OB ED note    Name: Elinor Blanchard MRN: [id number]     YOB: 2000  Age: 24 y.o.  Sex: female      Reason for Presentation:  contractions/possible labor    HPI: Elinor Blanchard is a 24 y.o.  female with Estimated Date of Delivery: 11/10/24 at 39w6d gestation. Her obstetrical history is significant for uncomplicated pregnancy.  Prenatal records reviewed.     Here with complaint of contractions every 3-4 minutes. This is her 3rd triage visit in the last 2 days. She was 1 cm dilated at 1st visit, then was 1-2/60/-3 in the early hours of this morning. She was given something to help her sleep, and the contractions spaced out for a while, but now are closer together again and are more intense.    She reports good fetal movement. She denies vaginal bleeding. She denies leakage of fluid.      Past History:  OB History    Para Term  AB Living   1   0 0 0 0   SAB IAB Ectopic Molar Multiple Live Births                    # Outcome Date GA Lbr Wale/2nd Weight Sex Type Anes PTL Lv   1 Current              Past Medical History:   Diagnosis Date    Chlamydia     HSV-2 infection     Ill-defined condition     Biliary Dyskinesia     Past Surgical History:   Procedure Laterality Date    GALLBLADDER SURGERY       Social History     Tobacco Use    Smoking status: Never     Passive exposure: Never    Smokeless tobacco: Never   Substance Use Topics    Alcohol use: No     Prior to Admission medications    Medication Sig Start Date End Date Taking? Authorizing Provider   valACYclovir (VALTREX) 1 g tablet Take 1 tablet by mouth daily 10/17/24   Mehnaz Berg MD   Prenatal Vit-Fe Fumarate-FA (PRENATAL PO) Take by mouth    ProviderRakesh MD   acyclovir (ZOVIRAX) 800 MG tablet Take 1 tablet by mouth 2 times daily  Patient not taking: Reported on 2024   Mehnaz Berg MD     No Known Allergies    Physical Exam:  VITALS:  Vital

## 2024-11-09 NOTE — ANESTHESIA PROCEDURE NOTES
Epidural Block    Patient location during procedure: OB  Start time: 11/9/2024 5:20 PM  End time: 11/9/2024 5:30 PM  Reason for block: labor epidural  Staffing  Performed: anesthesiologist   Anesthesiologist: Mateo Kearney MD  Performed by: Mateo Kearney MD  Authorized by: Mateo Kearney MD    Epidural  Patient position: sitting  Prep: ChloraPrep  Patient monitoring: continuous pulse ox  Approach: midline  Location: L3-4  Injection technique: RAHUL air  Provider prep: mask and sterile gloves  Needle  Needle type: Tuohy   Needle gauge: 17 G  Needle length: 3.5 in  Needle insertion depth: 5 cm  Catheter type: end hole  Catheter size: 19 G  Catheter at skin depth: 10 cm  Test dose: negativeCatheter Secured: tegaderm and tape  Assessment  Hemodynamics: stable  Attempts: 1  Outcomes: uncomplicated and patient tolerated procedure well  Preanesthetic Checklist  Completed: patient identified, IV checked, risks and benefits discussed, surgical/procedural consents, equipment checked, pre-op evaluation, timeout performed, anesthesia consent given, oxygen available and monitors applied/VS acknowledged

## 2024-11-09 NOTE — PROGRESS NOTES
Dr. Momin at bedside. SVE, see MD note. Patient wants to walk and be rechecked in a hour.  
Patient given discharge information at this time. Instructed to return to [facility] with complaints of LOF, VB more than spotting, regular and painful contractions, and or DFM. Patient interested in being induced, but states no more OB visits. Instructed to call OB for follow up.   
Patient presents to [facility] with complaints of contractions since visit yesterday morning. States that contractions have picked up in frequency and intensity for the past 3 hours. States has had some spotting on and off today. Reports good FM. Denies LOF. US and toco placed on soft, non-tender abdomen.  
SVE unchanged. Patient does want to proceed with ordered pain medication at this time.   
alert and awake

## 2024-11-09 NOTE — H&P
History & Physical    Name: Elinor Blanchard MRN: [id number]  SSN: [id number]    YOB: 2000  Age: 24 y.o.  Sex: female      Subjective:     Reason for Triage visit:  39w6d and contractions    History of Present Illness: Ms. Blanchard is a 24 y.o.  female  with an estimated gestational age of 39w6d with Estimated Date of Delivery: 11/10/24. Patient states that she was here yesterday for a labor check. Contractions have been more painful since 11 PM, rates as 5/10.  Most recently every 3 minutes but states that upon arrival to [facility], ctx have spaced to q 5 minutes. Good FM.  No vaginal bleeding, just some light spotting . No LOF.        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    OB History    Para Term  AB Living   1   0 0 0 0   SAB IAB Ectopic Molar Multiple Live Births                    # Outcome Date GA Lbr Wale/2nd Weight Sex Type Anes PTL Lv   1 Current              Past Medical History:   Diagnosis Date    Chlamydia     HSV-2 infection     Ill-defined condition     Biliary Dyskinesia     Past Surgical History:   Procedure Laterality Date    GALLBLADDER SURGERY       Social History     Occupational History    Not on file   Tobacco Use    Smoking status: Never     Passive exposure: Never    Smokeless tobacco: Never   Vaping Use    Vaping status: Never Used   Substance and Sexual Activity    Alcohol use: No    Drug use: No    Sexual activity: Yes     Partners: Male      Family History   Problem Relation Age of Onset    Hypertension Maternal Grandfather     Elevated Lipids Paternal Grandfather     Cancer Paternal Grandfather         stomach    Hypertension Paternal Grandfather     Thyroid Cancer Maternal Grandmother        No Known Allergies  Prior to Admission medications    Medication Sig Start Date End Date Taking?

## 2024-11-09 NOTE — PLAN OF CARE
Problem: Pain  Goal: Verbalizes/displays adequate comfort level or baseline comfort level  Outcome: Progressing     Problem: Infection - Adult  Goal: Absence of infection during hospitalization  Outcome: Progressing     Problem: Safety - Adult  Goal: Free from fall injury  Outcome: Progressing

## 2024-11-09 NOTE — PROGRESS NOTES
OB ED     Pt to [facility] with complaints of contractions q 3 minutes for the last 2 hours. Pt was seen in triage last night and was 1.5cm ,received Stadol and went home. EFM and TOCO applied. Abd palpated soft. Positive fetal movement noted, denies LOF or VB. [facility] notified of patient's arrival.

## 2024-11-09 NOTE — PLAN OF CARE
Problem: Vaginal Birth or  Section  Goal: Fetal and maternal status remain reassuring during the birth process  Description:  Birth OB-Pregnancy care plan goal which identifies if the fetal and maternal status remain reassuring during the birth process  2024 by Amara Ovalle RN  Outcome: Progressing  2024 by Amara Ovalle RN  Outcome: Progressing     Problem: Pain  Goal: Verbalizes/displays adequate comfort level or baseline comfort level  2024 by Amara Ovalle RN  Outcome: Progressing  2024 by Amara Ovalle RN  Outcome: Progressing     Problem: Infection - Adult  Goal: Absence of infection during hospitalization  2024 by Amara Ovalle RN  Outcome: Progressing  2024 by Amara Ovalle RN  Outcome: Progressing     Problem: Safety - Adult  Goal: Free from fall injury  2024 by Amara Ovalle RN  Outcome: Progressing  2024 by Amara Ovalle RN  Outcome: Progressing

## 2024-11-09 NOTE — DISCHARGE INSTRUCTIONS
Weeks 40 to 41 of Your Pregnancy: Care Instructions  By week 40, you've reached your due date. Your baby could be coming any day. Most babies born after their due dates are healthy. But you may have tests to make sure everything is okay. If you feel stressed, you could try a meditation exercise, such as guided imagery. It may help you relax.    If you are past 41 weeks, your doctor may measure the amount of fluid that surrounds your baby. They may also test your baby's movement and heart rate.   If you don't start labor on your own by 41 or 42 weeks, your doctor may want to start (induce) labor. If there are other concerns, your doctor may talk to you about a .   To start (induce) your labor, your doctor may do any of these things.    Place a narrow tube with a small balloon on the end (balloon catheter) into your cervix.  The doctor inflates the balloon. This helps your cervix open (dilate).     Sweep the membranes (separate the lining of the amniotic sac from the uterus).  This helps the uterus make a chemical that can start contractions.     \"Break your water\" (rupture the amniotic sac).  This may be done if your cervix has started to open.     Use medicines.  They may be used to soften the cervix or start contractions.   How to do guided imagery    Close your eyes, and take a few deep breaths. Picture a peaceful setting, such as a beach or a meadow. Add a winding path. Follow the path until you feel more and more relaxed.   Take a few minutes to breathe slowly and feel the calm. When you are ready, slowly take yourself back to the present. Count to 3, and open your eyes.   Follow-up care is a key part of your treatment and safety. Be sure to make and go to all appointments, and call your doctor if you are having problems. It's also a good idea to know your test results and keep a list of the medicines you take.  Where can you learn more?  Go to https://www.healthwise.net/patientEd and enter T922 to  also a good idea to know your test results and keep a list of the medicines you take.  Where can you learn more?  Go to https://www.[employer].net/patientEd and enter A811 to learn more about \"Week 39 of Your Pregnancy: Care Instructions.\"  Current as of: July 10, 2023  Content Version: 14.2  © 2024 [employer]   Care instructions adapted under license by [employer]. If you have questions about a medical condition or this instruction, always ask your healthcare professional. Healthwise[employer] disclaims any warranty or liability for your use of this information.

## 2024-11-10 LAB
ABO + RH BLD: NORMAL
ARTERIAL PATENCY WRIST A: ABNORMAL
ARTERIAL PATENCY WRIST A: ABNORMAL
BASE DEFICIT BLD-SCNC: 5.6 MMOL/L
BASE DEFICIT BLD-SCNC: 7.1 MMOL/L
BLOOD BANK CMNT PATIENT-IMP: NORMAL
BLOOD GROUP ANTIBODIES SERPL: NORMAL
FETAL SCREEN: NORMAL
HCO3 BLD-SCNC: 20.6 MMOL/L (ref 22–26)
HCO3 BLDV-SCNC: 18 MMOL/L (ref 23–28)
O2/TOTAL GAS SETTING VFR VENT: 21 %
O2/TOTAL GAS SETTING VFR VENT: 21 %
PCO2 BLDCO: 30 MMHG (ref 32–68)
PCO2 BLDCO: 48 MMHG (ref 32–68)
PH BLDCO: 7.24 (ref 7.15–7.38)
PH BLDCO: 7.39 (ref 7.15–7.38)
PO2 BLDCO: 25 MMHG
PO2 BLDCO: 35 MMHG
SAO2 % BLD: 35.4 % (ref 95–98)
SAO2 % BLDV: 67.2 % (ref 65–88)
SERVICE CMNT-IMP: ABNORMAL
SERVICE CMNT-IMP: ABNORMAL
SPECIMEN EXP DATE BLD: NORMAL
SPECIMEN TYPE: ABNORMAL
SPECIMEN TYPE: ABNORMAL

## 2024-11-10 PROCEDURE — 1100000000 HC RM PRIVATE

## 2024-11-10 PROCEDURE — 82803 BLOOD GASES ANY COMBINATION: CPT

## 2024-11-10 PROCEDURE — 2580000003 HC RX 258: Performed by: OBSTETRICS & GYNECOLOGY

## 2024-11-10 PROCEDURE — 6360000002 HC RX W HCPCS: Performed by: OBSTETRICS & GYNECOLOGY

## 2024-11-10 PROCEDURE — 7100000010 HC PHASE II RECOVERY - FIRST 15 MIN

## 2024-11-10 PROCEDURE — 6370000000 HC RX 637 (ALT 250 FOR IP): Performed by: OBSTETRICS & GYNECOLOGY

## 2024-11-10 PROCEDURE — 7210000100 HC LABOR FEE PER 1 HR

## 2024-11-10 PROCEDURE — 36600 WITHDRAWAL OF ARTERIAL BLOOD: CPT

## 2024-11-10 PROCEDURE — 6360000002 HC RX W HCPCS

## 2024-11-10 PROCEDURE — 36415 COLL VENOUS BLD VENIPUNCTURE: CPT

## 2024-11-10 PROCEDURE — 85461 HEMOGLOBIN FETAL: CPT

## 2024-11-10 PROCEDURE — 7220000101 HC DELIVERY VAGINAL/SINGLE

## 2024-11-10 PROCEDURE — 59409 OBSTETRICAL CARE: CPT | Performed by: OBSTETRICS & GYNECOLOGY

## 2024-11-10 PROCEDURE — 51701 INSERT BLADDER CATHETER: CPT

## 2024-11-10 PROCEDURE — 7100000011 HC PHASE II RECOVERY - ADDTL 15 MIN

## 2024-11-10 RX ORDER — DOCUSATE SODIUM 100 MG/1
100 CAPSULE, LIQUID FILLED ORAL 2 TIMES DAILY
Status: DISCONTINUED | OUTPATIENT
Start: 2024-11-10 | End: 2024-11-12 | Stop reason: HOSPADM

## 2024-11-10 RX ORDER — FAMOTIDINE 20 MG/1
20 TABLET, FILM COATED ORAL 2 TIMES DAILY PRN
Status: DISCONTINUED | OUTPATIENT
Start: 2024-11-10 | End: 2024-11-12 | Stop reason: HOSPADM

## 2024-11-10 RX ORDER — OXYCODONE HYDROCHLORIDE 5 MG/1
10 TABLET ORAL EVERY 4 HOURS PRN
Status: DISCONTINUED | OUTPATIENT
Start: 2024-11-10 | End: 2024-11-12 | Stop reason: HOSPADM

## 2024-11-10 RX ORDER — METHYLERGONOVINE MALEATE 0.2 MG/ML
200 INJECTION INTRAVENOUS PRN
Status: DISCONTINUED | OUTPATIENT
Start: 2024-11-10 | End: 2024-11-12 | Stop reason: HOSPADM

## 2024-11-10 RX ORDER — IBUPROFEN 800 MG/1
800 TABLET, FILM COATED ORAL EVERY 8 HOURS SCHEDULED
Status: DISCONTINUED | OUTPATIENT
Start: 2024-11-10 | End: 2024-11-12 | Stop reason: HOSPADM

## 2024-11-10 RX ORDER — SODIUM CHLORIDE 0.9 % (FLUSH) 0.9 %
5-40 SYRINGE (ML) INJECTION EVERY 12 HOURS SCHEDULED
Status: DISCONTINUED | OUTPATIENT
Start: 2024-11-10 | End: 2024-11-10

## 2024-11-10 RX ORDER — ONDANSETRON 2 MG/ML
4 INJECTION INTRAMUSCULAR; INTRAVENOUS EVERY 6 HOURS PRN
Status: DISCONTINUED | OUTPATIENT
Start: 2024-11-10 | End: 2024-11-12 | Stop reason: HOSPADM

## 2024-11-10 RX ORDER — SODIUM CHLORIDE 0.9 % (FLUSH) 0.9 %
5-40 SYRINGE (ML) INJECTION PRN
Status: DISCONTINUED | OUTPATIENT
Start: 2024-11-10 | End: 2024-11-12 | Stop reason: HOSPADM

## 2024-11-10 RX ORDER — ONDANSETRON 4 MG/1
4 TABLET, ORALLY DISINTEGRATING ORAL EVERY 6 HOURS PRN
Status: DISCONTINUED | OUTPATIENT
Start: 2024-11-10 | End: 2024-11-12 | Stop reason: HOSPADM

## 2024-11-10 RX ORDER — OXYCODONE HYDROCHLORIDE 5 MG/1
5 TABLET ORAL EVERY 4 HOURS PRN
Status: DISCONTINUED | OUTPATIENT
Start: 2024-11-10 | End: 2024-11-12 | Stop reason: HOSPADM

## 2024-11-10 RX ORDER — MISOPROSTOL 200 UG/1
200 TABLET ORAL PRN
Status: DISCONTINUED | OUTPATIENT
Start: 2024-11-10 | End: 2024-11-12 | Stop reason: HOSPADM

## 2024-11-10 RX ORDER — DEXTROSE, SODIUM CHLORIDE, SODIUM LACTATE, POTASSIUM CHLORIDE, AND CALCIUM CHLORIDE 5; .6; .31; .03; .02 G/100ML; G/100ML; G/100ML; G/100ML; G/100ML
INJECTION, SOLUTION INTRAVENOUS CONTINUOUS
Status: DISCONTINUED | OUTPATIENT
Start: 2024-11-10 | End: 2024-11-10

## 2024-11-10 RX ORDER — LANOLIN
CREAM (ML) TOPICAL PRN
Status: DISCONTINUED | OUTPATIENT
Start: 2024-11-10 | End: 2024-11-12 | Stop reason: HOSPADM

## 2024-11-10 RX ORDER — SODIUM CHLORIDE 9 MG/ML
INJECTION, SOLUTION INTRAVENOUS PRN
Status: DISCONTINUED | OUTPATIENT
Start: 2024-11-10 | End: 2024-11-12 | Stop reason: HOSPADM

## 2024-11-10 RX ORDER — MISOPROSTOL 200 UG/1
400 TABLET ORAL PRN
Status: DISCONTINUED | OUTPATIENT
Start: 2024-11-10 | End: 2024-11-10

## 2024-11-10 RX ORDER — FENTANYL CITRATE 50 UG/ML
INJECTION, SOLUTION INTRAMUSCULAR; INTRAVENOUS
Status: DISCONTINUED | OUTPATIENT
Start: 2024-11-10 | End: 2024-11-10 | Stop reason: SDUPTHER

## 2024-11-10 RX ORDER — SIMETHICONE 80 MG
80 TABLET,CHEWABLE ORAL EVERY 6 HOURS PRN
Status: DISCONTINUED | OUTPATIENT
Start: 2024-11-10 | End: 2024-11-12 | Stop reason: HOSPADM

## 2024-11-10 RX ORDER — ACETAMINOPHEN 500 MG
1000 TABLET ORAL EVERY 8 HOURS SCHEDULED
Status: DISCONTINUED | OUTPATIENT
Start: 2024-11-10 | End: 2024-11-12 | Stop reason: HOSPADM

## 2024-11-10 RX ADMIN — Medication: at 09:22

## 2024-11-10 RX ADMIN — Medication 166.7 ML: at 04:40

## 2024-11-10 RX ADMIN — ACETAMINOPHEN 1000 MG: 500 TABLET, FILM COATED ORAL at 17:34

## 2024-11-10 RX ADMIN — DOCUSATE SODIUM 100 MG: 100 CAPSULE, LIQUID FILLED ORAL at 09:18

## 2024-11-10 RX ADMIN — DOCUSATE SODIUM 100 MG: 100 CAPSULE, LIQUID FILLED ORAL at 21:21

## 2024-11-10 RX ADMIN — FENTANYL CITRATE 100 MCG: 50 INJECTION, SOLUTION INTRAMUSCULAR; INTRAVENOUS at 02:03

## 2024-11-10 RX ADMIN — WITCH HAZEL: 500 SOLUTION RECTAL; TOPICAL at 09:22

## 2024-11-10 RX ADMIN — IBUPROFEN 800 MG: 800 TABLET, FILM COATED ORAL at 13:32

## 2024-11-10 RX ADMIN — IBUPROFEN 800 MG: 800 TABLET, FILM COATED ORAL at 05:33

## 2024-11-10 RX ADMIN — OXYTOCIN 1 MILLI-UNITS/MIN: 10 INJECTION, SOLUTION INTRAMUSCULAR; INTRAVENOUS at 01:00

## 2024-11-10 RX ADMIN — ACETAMINOPHEN 1000 MG: 500 TABLET, FILM COATED ORAL at 09:18

## 2024-11-10 RX ADMIN — RHO(D) IMMUNE GLOBULIN (HUMAN) 300 MCG: 1500 SOLUTION INTRAMUSCULAR at 21:21

## 2024-11-10 RX ADMIN — IBUPROFEN 800 MG: 800 TABLET, FILM COATED ORAL at 21:21

## 2024-11-10 NOTE — LACTATION NOTE
This note was copied from a baby's chart.  Lactation visit. First time mom, baby just born early morning. Has latched well x 2, due to feed again now. Showing cues. Reviewed expectations for first 24 hours of life. Watch for feeding cues. Feed on demand. Encouraged skin to skin with feeds. Baby undressed and unwrapped for feeding. Assisted in football hold on left breast. Everted nipples. Reviewed supportive hold and breast compression, baby latched very well on first try. Good latch, deeply on breast and staying on well. Fed well x 15 minutes and then asleep. Skin to skin now on mom's chest. Offer 2nd breast if baby willing. All questions answered. Doing well.

## 2024-11-10 NOTE — L&D DELIVERY NOTE
Luis F Blanchard Elinor [[id number]]      Labor Events     Labor: No   Steroids: None  Cervical Ripening Date/Time:      Antibiotics Received during Labor: No  Rupture Date/Time:  24 18:20:00   Rupture Type: AROM, Intact  Fluid Color: Clear, Meconium  Meconium Consistency: Thick  Fluid Odor: None  Fluid Volume: Moderate  Augmentation: AROM, Oxytocin  Labor Complications: None       Anesthesia    Method: Epidural       Labor Event Times      Labor onset date/time:  24 10:00:00 EST     Dilation complete date/time:        Start pushing date/time:     Decision date/time (emergent ):            Labor Length    3rd stage: 0h 03m       Delivery Details      Delivery Date: 11/10/24 Delivery Time: 04:38:55   Delivery Type: Vaginal, Spontaneous              [place] Presentation    Presentation: Vertex  Position: Left  _: Occiput  _: Anterior       Shoulder Dystocia    Shoulder Dystocia Present?: No       Assisted Delivery Details    Forceps Attempted?: No  Vacuum Extractor Attempted?: No                           Cord    Vessels: 3 Vessels  Complications: None  Delayed Cord Clamping?: Yes  Cord Clamped Date/Time: 11/10/2024 04:39:45  Cord Blood Disposition: Lab  Gases Sent?: Yes              Placenta    Date/Time: 11/10/2024 04:42:42  Removal: Spontaneous  Appearance: Intact  Disposition: Discarded       Lacerations    Episiotomy: None  Perineal Lacerations: None  Other Lacerations: no non-perineal laceration  Number of Repair Packets: 0       Vaginal Counts    Initial Count Personnel: JERRICA MARTI  Initial Count Verified By: BIANKA HEREDIA RN  Intial Sponge Count: Correct Intial Needles Count: Correct Intial Instruments Count: Correct   Final Sponges Count: Correct Final Needles  Count: Correct Final Instruments Count: Correct   Final Count Personnel: JERRICA MARTI  Final Count Verified By: BIANKA HEREDIA RN  Accurate Final Count?: Yes       Blood Loss  Mother: GeorgetamyvishElinor

## 2024-11-11 PROCEDURE — 1100000000 HC RM PRIVATE

## 2024-11-11 PROCEDURE — 6370000000 HC RX 637 (ALT 250 FOR IP): Performed by: OBSTETRICS & GYNECOLOGY

## 2024-11-11 RX ADMIN — ACETAMINOPHEN 1000 MG: 500 TABLET, FILM COATED ORAL at 00:40

## 2024-11-11 RX ADMIN — DOCUSATE SODIUM 100 MG: 100 CAPSULE, LIQUID FILLED ORAL at 20:14

## 2024-11-11 RX ADMIN — IBUPROFEN 800 MG: 800 TABLET, FILM COATED ORAL at 08:01

## 2024-11-11 RX ADMIN — DOCUSATE SODIUM 100 MG: 100 CAPSULE, LIQUID FILLED ORAL at 08:01

## 2024-11-11 NOTE — PROGRESS NOTES
Daily Progress note:    Patient is PPD#1 S/P vaginal delivery at term . No complaints today.  Lochia < menses.  No GI/ issues.  No F/C.    VITALS  Patient Vitals for the past 24 hrs:   Temp Pulse Resp BP SpO2   24 0720 98.8 °F (37.1 °C) 80 18 105/62 99 %   11/10/24 2256 97.7 °F (36.5 °C) 58 18 (!) 113/57 100 %        CV - RRR  LUNGS - CTA bilaterally  ABD - soft, approp tend, FF below umbilicus  EXT - tr edema bilaterally          Labs:  No results found for this or any previous visit (from the past 24 hour(s)).      PPD #1      Pt is breast feeding. No issues or complaints today.  Stable.  Routine PP.     A NEGATIVE. BBT RH +. Rhogam prior to DC.        Laurita Florez DO  10:30 AM  24

## 2024-11-11 NOTE — LACTATION NOTE
This note was copied from a baby's chart.  In to follow up with mom and infant. Mom stated that infant has continued to latch and nurse well. She has no concerns at this time. Reviewed the second night of life.

## 2024-11-11 NOTE — PROGRESS NOTES
Shift assessment complete as noted. Infant with mother . Parents encouraged to call for needs or concerns.

## 2024-11-12 ENCOUNTER — LACTATION ENCOUNTER (OUTPATIENT)
Dept: MOTHER INFANT UNIT | Age: 24
End: 2024-11-12

## 2024-11-12 VITALS
HEART RATE: 61 BPM | OXYGEN SATURATION: 97 % | TEMPERATURE: 99.3 F | DIASTOLIC BLOOD PRESSURE: 61 MMHG | RESPIRATION RATE: 16 BRPM | SYSTOLIC BLOOD PRESSURE: 106 MMHG

## 2024-11-12 PROCEDURE — 6370000000 HC RX 637 (ALT 250 FOR IP): Performed by: OBSTETRICS & GYNECOLOGY

## 2024-11-12 RX ORDER — IBUPROFEN 600 MG/1
600 TABLET, FILM COATED ORAL EVERY 6 HOURS PRN
Qty: 60 TABLET | Refills: 0 | Status: SHIPPED | OUTPATIENT
Start: 2024-11-12

## 2024-11-12 RX ADMIN — DOCUSATE SODIUM 100 MG: 100 CAPSULE, LIQUID FILLED ORAL at 10:12

## 2024-11-12 NOTE — CARE COORDINATION
Chart reviewed - first time parent.  SW met with patient to complete initial assessment.     provided education on [facility] [place] [facility].  Family was undecided on need for home visit.  No referral will be made at this time.  Family has this 's contact information should they decide to participate in program.    Patient given informational packet on  mood & anxiety disorders (resources/education).    Family denies any additional needs from  at this time.  Family has 's contact information should any needs/questions arise.    Lashaun Burgos, PAIGE-CP, [facility]  [facility]   [phone]

## 2024-11-12 NOTE — PLAN OF CARE
Problem: Pain  Goal: Verbalizes/displays adequate comfort level or baseline comfort level  Outcome: Progressing     Problem: Infection - Adult  Goal: Absence of infection during hospitalization  Outcome: Progressing     Problem: Safety - Adult  Goal: Free from fall injury  Outcome: Progressing     Problem: Postpartum  Goal: Experiences normal postpartum course  Description:  Postpartum OB-Pregnancy care plan goal which identifies if the mother is experiencing a normal postpartum course  Outcome: Progressing  Goal: Appropriate maternal -  bonding  Description:  Postpartum OB-Pregnancy care plan goal which identifies if the mother and  are bonding appropriately  Outcome: Progressing  Goal: Establishment of infant feeding pattern  Description:  Postpartum OB-Pregnancy care plan goal which identifies if the mother is establishing a feeding pattern with their   Outcome: Progressing  Goal: Incisions, wounds, or drain sites healing without S/S of infection  Outcome: Progressing     Problem: Chronic Conditions and Co-morbidities  Goal: Patient's chronic conditions and co-morbidity symptoms are monitored and maintained or improved  Outcome: Progressing

## 2024-11-12 NOTE — PROGRESS NOTES
[facility]  [address], [place] [address]  [phone]    Danis Suárez MD, FACOG  Daisy Wong [facility]  Rajwinder Michaels MD, FACOG    Patient is S/P vaginal delivery at 40 weeks, labor. No complaints today.  Lochia < menses.  No GI/ issues.  No F/C.    VITALS  Patient Vitals for the past 24 hrs:   BP Temp Temp src Pulse Resp SpO2   24 0747 106/61 99.3 °F (37.4 °C) Oral 61 16 97 %   24 2333 125/84 98.6 °F (37 °C) Oral 90 16 99 %   24 1554 122/82 98.6 °F (37 °C) Oral 83 16 100 %        CV - RRR  LUNGS - CTA bilaterally  ABD - soft, approp tend, FF below umbilicus  EXT - tr edema bilaterally          Labs:  No results found for this or any previous visit (from the past 24 hour(s)).       PPD #2      Pt is breast feeding. No issues or complaints today.  Stable.  Routine PP instructions      Danis Suárez MD  9:56 AM  24

## 2024-11-12 NOTE — DISCHARGE INSTRUCTIONS
anxiety, sleeplessness, and mood swings after you go home. If you feel upset or hopeless for more than a few days or are having trouble doing the things you need to do, talk to your doctor.  Constipation and hemorrhoids  Drink plenty of fluids. If you have kidney, heart, or liver disease and have to limit fluids, talk with your doctor before you increase the amount of fluids you drink.  Eat plenty of fiber each day. Have a bran muffin or bran cereal for breakfast. Try eating a piece of fruit for a mid-afternoon snack.  For painful, itchy hemorrhoids, put ice or a cold pack on the area several times a day for 10 minutes at a time. Follow this by putting a warm compress on the area for another 10 to 20 minutes or by sitting in a shallow, warm bath.  When should you call for help?  Share this information with your partner, family, or a friend. They can help you watch for warning signs.  Call 911  anytime you think you may need emergency care. For example, call if:    You feel you cannot stop from hurting yourself, your baby, or someone else.     You passed out (lost consciousness).     You have chest pain, are short of breath, or cough up blood.     You have a seizure.   Where to get help 24 hours a day, 7 days a week   If you or someone you know talks about suicide, self-harm, a mental health crisis, a substance use crisis, or any other kind of emotional distress, get help right away. You can:    Call the Suicide and Crisis Lifeline at [facility].     Call 7-481-434-TALK (1-806.766.9781).     Text HOME to 971714 to access the Crisis Text Line.   Consider saving these numbers in your phone.  Go to [employer] for more information or to chat online.  Call your doctor or midwife now or seek immediate medical care if:    You have signs of hemorrhage (too much bleeding), such as:  Heavy vaginal bleeding. This means that you are soaking through one or more pads in an hour. Or you pass blood clots bigger than an egg.  Feeling

## 2024-11-12 NOTE — DISCHARGE SUMMARY
[facility]  [address], [place] [address]  [phone]    Danis Suárez MD, FACOG  Daisy Wong [facility]  Rajwinder Michaels MD, FACOG  Date of Admission:  2024  3:52 PM  Date of Discharge:  2024  1:47 PM    Patient Active Problem List   Diagnosis    Menorrhagia with regular cycle    Dysmenorrhea in adolescent    Cyst of joint of hand    Supervision of normal first pregnancy in second trimester    Rh negative status during pregnancy in second trimester, antepartum    Cervical high risk human papillomavirus (HPV) DNA test positive    Low-lying placenta    39 weeks gestation of pregnancy    Abdominal pain in pregnancy, third trimester    Normal labor        Elinor Blanchard 24 y.o.  presented at 40w0d for in active labor.  Pt had  without incident.  See delivery note for all delivery details.  Pt's PP course was uneventful.  On day of D/C, she was ambulating well, afebrile, with lochia < menses.  She was discharged home with medications as below.  Pt was breast feeding on discharge.  Routine PP instructions given to patient.  She is to follow up with [facility] in 6 weeks for PP exam.       Medication List        START taking these medications      ibuprofen 600 MG tablet  Commonly known as: ADVIL;MOTRIN  Take 1 tablet by mouth every 6 hours as needed for Pain            CONTINUE taking these medications      PRENATAL PO            STOP taking these medications      acyclovir 800 MG tablet  Commonly known as: ZOVIRAX     valACYclovir 1 g tablet  Commonly known as: Valtrex               Where to Get Your Medications        These medications [facility] to [id number] - [place], [place] - [address] - P [phone] - F [phone] [address] [id number]      Phone: [phone]   ibuprofen 600 MG tablet         Danis Suárez MD  2:49 PM  24

## 2024-12-18 ENCOUNTER — POSTPARTUM VISIT (OUTPATIENT)
Dept: OBGYN CLINIC | Age: 24
End: 2024-12-18

## 2024-12-18 VITALS
DIASTOLIC BLOOD PRESSURE: 74 MMHG | SYSTOLIC BLOOD PRESSURE: 114 MMHG | HEIGHT: 63 IN | WEIGHT: 138.8 LBS | BODY MASS INDEX: 24.59 KG/M2

## 2024-12-18 DIAGNOSIS — Z11.51 SCREENING FOR HUMAN PAPILLOMAVIRUS (HPV): ICD-10-CM

## 2024-12-18 DIAGNOSIS — Z12.4 SCREENING FOR CERVICAL CANCER: ICD-10-CM

## 2024-12-18 DIAGNOSIS — R87.810 HUMAN PAPILLOMAVIRUS (HPV) TYPE 18 OR 45 DNA DETECTED IN CERVICAL SPECIMEN: ICD-10-CM

## 2024-12-27 LAB
COLLECTION METHOD: NORMAL
CYTOLOGIST CVX/VAG CYTO: NORMAL
CYTOLOGY CVX/VAG DOC THIN PREP: NORMAL
DATE OF LMP: 0
HPV APTIMA: NEGATIVE
HPV GENOTYPE REFLEX: NORMAL
Lab: NORMAL
Lab: NORMAL
OTHER PT INFO: NORMAL
PAP SOURCE: NORMAL
PATH REPORT.FINAL DX SPEC: NORMAL
PREV CYTO INFO: NEGATIVE
PREV TREATMENT RESULTS: NORMAL
PREV TREATMENT: NORMAL
STAT OF ADQ CVX/VAG CYTO-IMP: NORMAL

## 2025-01-08 ENCOUNTER — OFFICE VISIT (OUTPATIENT)
Dept: OBGYN CLINIC | Age: 25
End: 2025-01-08

## 2025-01-08 VITALS
DIASTOLIC BLOOD PRESSURE: 72 MMHG | SYSTOLIC BLOOD PRESSURE: 124 MMHG | WEIGHT: 135 LBS | BODY MASS INDEX: 23.92 KG/M2 | HEIGHT: 63 IN

## 2025-01-08 DIAGNOSIS — Z11.51 SCREENING FOR HUMAN PAPILLOMAVIRUS (HPV): ICD-10-CM

## 2025-01-08 DIAGNOSIS — Z12.4 SCREENING FOR CERVICAL CANCER: ICD-10-CM

## 2025-01-08 DIAGNOSIS — R87.615 UNSATISFACTORY CERVICAL PAPANICOLAOU SMEAR: Primary | ICD-10-CM

## 2025-01-15 LAB
COLLECTION METHOD: NORMAL
CYTOLOGIST CVX/VAG CYTO: NORMAL
CYTOLOGY CVX/VAG DOC THIN PREP: NORMAL
HPV APTIMA: NEGATIVE
HPV GENOTYPE REFLEX: NORMAL
Lab: NORMAL
OTHER PT INFO: NORMAL
PAP SOURCE: NORMAL
PATH REPORT.FINAL DX SPEC: NORMAL
PREV CYTO INFO: NORMAL
PREV TREATMENT RESULTS: NORMAL
PREV TREATMENT: NORMAL
STAT OF ADQ CVX/VAG CYTO-IMP: NORMAL